# Patient Record
Sex: MALE | Race: WHITE | NOT HISPANIC OR LATINO | Employment: FULL TIME | URBAN - METROPOLITAN AREA
[De-identification: names, ages, dates, MRNs, and addresses within clinical notes are randomized per-mention and may not be internally consistent; named-entity substitution may affect disease eponyms.]

---

## 2017-07-11 ENCOUNTER — APPOINTMENT (OUTPATIENT)
Dept: LAB | Facility: HOSPITAL | Age: 57
End: 2017-07-11

## 2017-07-11 ENCOUNTER — TRANSCRIBE ORDERS (OUTPATIENT)
Dept: ADMINISTRATIVE | Facility: HOSPITAL | Age: 57
End: 2017-07-11

## 2017-07-11 DIAGNOSIS — Z00.8 HEALTH EXAMINATION IN POPULATION SURVEY: ICD-10-CM

## 2017-07-11 DIAGNOSIS — Z00.8 HEALTH EXAMINATION IN POPULATION SURVEY: Primary | ICD-10-CM

## 2017-07-11 LAB
CHOLEST SERPL-MCNC: 255 MG/DL (ref 50–200)
EST. AVERAGE GLUCOSE BLD GHB EST-MCNC: 117 MG/DL
HBA1C MFR BLD: 5.7 % (ref 4.2–6.3)
HDLC SERPL-MCNC: 51 MG/DL (ref 40–60)
LDLC SERPL CALC-MCNC: 169 MG/DL (ref 0–100)
TRIGL SERPL-MCNC: 176 MG/DL

## 2017-07-11 PROCEDURE — 83036 HEMOGLOBIN GLYCOSYLATED A1C: CPT

## 2017-07-11 PROCEDURE — 80061 LIPID PANEL: CPT

## 2017-07-11 PROCEDURE — 36415 COLL VENOUS BLD VENIPUNCTURE: CPT

## 2018-08-08 ENCOUNTER — TRANSCRIBE ORDERS (OUTPATIENT)
Dept: ADMINISTRATIVE | Facility: HOSPITAL | Age: 58
End: 2018-08-08

## 2018-08-08 ENCOUNTER — APPOINTMENT (OUTPATIENT)
Dept: LAB | Facility: HOSPITAL | Age: 58
End: 2018-08-08
Payer: COMMERCIAL

## 2018-08-08 DIAGNOSIS — Z00.8 HEALTH EXAMINATION IN POPULATION SURVEY: ICD-10-CM

## 2018-08-08 DIAGNOSIS — Z00.8 HEALTH EXAMINATION IN POPULATION SURVEY: Primary | ICD-10-CM

## 2018-08-08 LAB
CHOLEST SERPL-MCNC: 262 MG/DL (ref 50–200)
EST. AVERAGE GLUCOSE BLD GHB EST-MCNC: 120 MG/DL
HBA1C MFR BLD: 5.8 % (ref 4.2–6.3)
HDLC SERPL-MCNC: 49 MG/DL (ref 40–60)
LDLC SERPL CALC-MCNC: 173 MG/DL (ref 0–100)
NONHDLC SERPL-MCNC: 213 MG/DL
TRIGL SERPL-MCNC: 201 MG/DL

## 2018-08-08 PROCEDURE — 83036 HEMOGLOBIN GLYCOSYLATED A1C: CPT

## 2018-08-08 PROCEDURE — 80061 LIPID PANEL: CPT

## 2018-08-08 PROCEDURE — 36415 COLL VENOUS BLD VENIPUNCTURE: CPT

## 2018-11-19 RX ORDER — HYDROCODONE/ACETAMINOPHEN 5 MG-500MG
6 TABLET ORAL DAILY
COMMUNITY

## 2018-11-19 RX ORDER — MULTIVITAMIN
1 CAPSULE ORAL DAILY
COMMUNITY

## 2018-11-19 RX ORDER — ASCORBIC ACID 500 MG
500 TABLET ORAL DAILY
COMMUNITY

## 2018-11-19 NOTE — PRE-PROCEDURE INSTRUCTIONS
Pre-Surgery Instructions:   Medication Instructions    ascorbic acid (VITAMIN C) 500 mg tablet Patient was instructed by Physician and understands   esomeprazole (NexIUM) 20 mg capsule Patient was instructed by Physician and understands   lutein 6 mg Patient was instructed by Physician and understands   Multiple Vitamin (MULTIVITAMIN) capsule Patient was instructed by Physician and understands  Follow prep as per physician  You will receive a call with your time to arrive at the hospital   Secure transportation, you will be having anesthesia

## 2018-11-21 ENCOUNTER — ANESTHESIA EVENT (OUTPATIENT)
Dept: PERIOP | Facility: HOSPITAL | Age: 58
End: 2018-11-21
Payer: COMMERCIAL

## 2018-11-21 ENCOUNTER — HOSPITAL ENCOUNTER (OUTPATIENT)
Facility: HOSPITAL | Age: 58
Setting detail: OUTPATIENT SURGERY
Discharge: HOME | End: 2018-11-21
Attending: INTERNAL MEDICINE | Admitting: INTERNAL MEDICINE
Payer: COMMERCIAL

## 2018-11-21 ENCOUNTER — ANESTHESIA (OUTPATIENT)
Dept: PERIOP | Facility: HOSPITAL | Age: 58
End: 2018-11-21
Payer: COMMERCIAL

## 2018-11-21 VITALS
TEMPERATURE: 98.2 F | RESPIRATION RATE: 16 BRPM | HEART RATE: 59 BPM | SYSTOLIC BLOOD PRESSURE: 132 MMHG | HEIGHT: 69 IN | BODY MASS INDEX: 23.52 KG/M2 | DIASTOLIC BLOOD PRESSURE: 72 MMHG | OXYGEN SATURATION: 99 % | WEIGHT: 158.8 LBS

## 2018-11-21 RX ORDER — METOCLOPRAMIDE HYDROCHLORIDE 5 MG/ML
10 INJECTION INTRAMUSCULAR; INTRAVENOUS ONCE AS NEEDED
Status: DISCONTINUED | OUTPATIENT
Start: 2018-11-21 | End: 2018-11-21 | Stop reason: HOSPADM

## 2018-11-21 RX ORDER — HYDRALAZINE HYDROCHLORIDE 20 MG/ML
5 INJECTION INTRAMUSCULAR; INTRAVENOUS AS NEEDED
Status: DISCONTINUED | OUTPATIENT
Start: 2018-11-21 | End: 2018-11-21 | Stop reason: HOSPADM

## 2018-11-21 RX ORDER — MEPERIDINE HYDROCHLORIDE 50 MG/ML
12.5 INJECTION INTRAMUSCULAR; INTRAVENOUS; SUBCUTANEOUS
Status: DISCONTINUED | OUTPATIENT
Start: 2018-11-21 | End: 2018-11-21 | Stop reason: HOSPADM

## 2018-11-21 RX ORDER — LABETALOL HYDROCHLORIDE 5 MG/ML
5 INJECTION, SOLUTION INTRAVENOUS AS NEEDED
Status: DISCONTINUED | OUTPATIENT
Start: 2018-11-21 | End: 2018-11-21 | Stop reason: HOSPADM

## 2018-11-21 RX ORDER — FENTANYL CITRATE/PF 50 MCG/ML
25 SYRINGE (ML) INJECTION
Status: DISCONTINUED | OUTPATIENT
Start: 2018-11-21 | End: 2018-11-21 | Stop reason: HOSPADM

## 2018-11-21 RX ORDER — PROPOFOL 10 MG/ML
INJECTION, EMULSION INTRAVENOUS AS NEEDED
Status: DISCONTINUED | OUTPATIENT
Start: 2018-11-21 | End: 2018-11-21 | Stop reason: SURG

## 2018-11-21 RX ORDER — ONDANSETRON 2 MG/ML
4 INJECTION INTRAMUSCULAR; INTRAVENOUS ONCE AS NEEDED
Status: DISCONTINUED | OUTPATIENT
Start: 2018-11-21 | End: 2018-11-21 | Stop reason: HOSPADM

## 2018-11-21 RX ORDER — SODIUM CHLORIDE 9 MG/ML
20 INJECTION, SOLUTION INTRAVENOUS CONTINUOUS
Status: DISCONTINUED | OUTPATIENT
Start: 2018-11-21 | End: 2018-11-21 | Stop reason: HOSPADM

## 2018-11-21 RX ORDER — PROMETHAZINE HYDROCHLORIDE 25 MG/ML
6.25 INJECTION, SOLUTION INTRAMUSCULAR; INTRAVENOUS ONCE AS NEEDED
Status: DISCONTINUED | OUTPATIENT
Start: 2018-11-21 | End: 2018-11-21 | Stop reason: HOSPADM

## 2018-11-21 RX ADMIN — PROPOFOL 20 MG: 10 INJECTION, EMULSION INTRAVENOUS at 09:26

## 2018-11-21 RX ADMIN — PROPOFOL 30 MG: 10 INJECTION, EMULSION INTRAVENOUS at 09:30

## 2018-11-21 RX ADMIN — PROPOFOL 30 MG: 10 INJECTION, EMULSION INTRAVENOUS at 09:32

## 2018-11-21 RX ADMIN — PROPOFOL 20 MG: 10 INJECTION, EMULSION INTRAVENOUS at 09:29

## 2018-11-21 RX ADMIN — SODIUM CHLORIDE: 0.9 INJECTION, SOLUTION INTRAVENOUS at 09:08

## 2018-11-21 RX ADMIN — PROPOFOL 20 MG: 10 INJECTION, EMULSION INTRAVENOUS at 09:35

## 2018-11-21 RX ADMIN — PROPOFOL 100 MG: 10 INJECTION, EMULSION INTRAVENOUS at 09:25

## 2018-11-21 RX ADMIN — PROPOFOL 20 MG: 10 INJECTION, EMULSION INTRAVENOUS at 09:38

## 2018-11-21 RX ADMIN — PROPOFOL 30 MG: 10 INJECTION, EMULSION INTRAVENOUS at 09:28

## 2018-11-21 RX ADMIN — SODIUM CHLORIDE 20 ML/HR: 0.9 INJECTION, SOLUTION INTRAVENOUS at 08:26

## 2018-11-21 NOTE — OP NOTE
Colonoscopy Procedure Note    Procedure: Colonoscopy    Sedation: Monitored anesthesia care, check anesthesia records    I      ASA Class: I     INDICATIONS:  Average risk screening    POST-OP DIAGNOSIS: See the impression below    Procedure Details     Prior colonoscopy: No prior colonoscopy  Informed consent was obtained for the procedure, including sedation  Risks of perforation, hemorrhage, adverse drug reaction and aspiration were discussed  The patient was placed in the left lateral decubitus position  Based on the pre-procedure assessment, including review of the patient's medical history, medications, allergies, and review of systems, he had been deemed to be an appropriate candidate for conscious sedation; he was therefore sedated with the medications listed below  The patient was monitored continuously with telemetry, pulse oximetry, blood pressure monitoring, and direct observations  A rectal examination was performed  The variable-stiffness pediatric colonoscope was inserted into the rectum and advanced under direct vision to the cecum, which was identified by the ileocecal valve and appendiceal orifice  The quality of the colonic preparation was good  A careful inspection was made as the colonoscope was withdrawn, including a retroflexed view of the rectum; findings and interventions are described below  Findings:  Scattered sigmoid diverticula  Internal and external hemorrhoids nonbleeding  No polyps or masses identified       Estimated Blood Loss: None    Specimens: None        Complications: None; patient tolerated the procedure well  Impression:    Negative average risk screening colonoscopy  Incidental finding of diverticulosis and hemorrhoids    Recommendations:  Repeat colonoscopy in 10 years or sooner if clinically indicated

## 2018-11-21 NOTE — ANESTHESIA POSTPROCEDURE EVALUATION
Post-Op Assessment Note      CV Status:  Stable    Mental Status:  Awake    Hydration Status:  Stable    PONV Controlled:  None    Airway Patency:  Patent and adequate    Post Op Vitals Reviewed: Yes          Staff: Anesthesiologist           BP      Temp      Pulse     Resp      SpO2

## 2018-11-21 NOTE — ANESTHESIA PREPROCEDURE EVALUATION
Review of Systems/Medical History  Patient summary reviewed  Chart reviewed  No history of anesthetic complications     Cardiovascular  Negative cardio ROS Exercise tolerance (METS): >4,     Pulmonary  Negative pulmonary ROS        GI/Hepatic    GERD well controlled,        Negative  ROS        Endo/Other  Negative endo/other ROS      GYN  Negative gynecology ROS          Hematology  Negative hematology ROS      Musculoskeletal  Negative musculoskeletal ROS        Neurology  Negative neurology ROS      Psychology   Negative psychology ROS              Physical Exam    Airway    Mallampati score: II  TM Distance: >3 FB  Neck ROM: full     Dental   No notable dental hx     Cardiovascular  Comment: Negative ROS, Rhythm: regular, Rate: normal, Cardiovascular exam normal    Pulmonary  Pulmonary exam normal Breath sounds clear to auscultation,     Other Findings        Anesthesia Plan  ASA Score- 1     Anesthesia Type- IV sedation with anesthesia with ASA Monitors  Additional Monitors:   Airway Plan:         Plan Factors-    Induction-     Postoperative Plan-     Informed Consent- Anesthetic plan and risks discussed with patient

## 2018-11-21 NOTE — DISCHARGE INSTRUCTIONS
Frantz Mccann      Dear Patient,    If specimens were collected, the office will call you with the pathology results within 2 weeks  It is very important that you follow these instructions:    Because you received intravenous sedation for your exam, you must return home and   · REST TODAY - DO NOT STAY ALONE  · DO NOT operate machinery for 24 hours   · DO NOT drink alcohol for 24 hours  · DO NOT drive for 24 hours  · DO NOT return to work until tomorrow   · DO NOT sign important paperwork for 24 hours    ·  If the site where your IV was placed is painful, place warm wet  compresses on the site until the soreness is relieved  Call us if there  is no improvement within 24 hours  · Unless otherwise instructed, you may resume your regular diet,  Start by taking  crackers and clear liquids  If no nausea or vomiting, you may advance your diet as tolerated  · Call your physician at [phone] if you develop any of the following symptoms:  · NEW OR INCREASED BLEEDING  · NEW ABDOMINAL DISTENTION (SWELLING)  · INCREASING NAUSEA, VOMITING OR PAIN  · FEVER/CHILLS  · BLACK/BLOODY STOOLS    If you are unable to contact the the doctor or your primary physician and you are having a severe complication, go to the nearest emergency room or call 911  Call [facility] at [phone] if you have any problems or questions related to your procedure  NOTE: After normal office hours (Monday-Friday 9:00 a m  to 4:00 p m ), the answering service will answer the phone, take a message, and they will contact the on call physician to call you

## 2019-02-14 PROCEDURE — 88305 TISSUE EXAM BY PATHOLOGIST: CPT | Performed by: PATHOLOGY

## 2019-02-15 ENCOUNTER — LAB REQUISITION (OUTPATIENT)
Dept: LAB | Facility: HOSPITAL | Age: 59
End: 2019-02-15
Payer: COMMERCIAL

## 2019-02-15 DIAGNOSIS — C44.629 SQUAMOUS CELL CARCINOMA OF SKIN OF LEFT UPPER LIMB, INCLUDING SHOULDER: ICD-10-CM

## 2019-03-12 PROCEDURE — 88305 TISSUE EXAM BY PATHOLOGIST: CPT | Performed by: PATHOLOGY

## 2019-03-13 ENCOUNTER — LAB REQUISITION (OUTPATIENT)
Dept: LAB | Facility: HOSPITAL | Age: 59
End: 2019-03-13
Payer: COMMERCIAL

## 2019-03-13 DIAGNOSIS — C43.62 MALIGNANT MELANOMA OF LEFT UPPER EXTREMITY INCLUDING SHOULDER (HCC): ICD-10-CM

## 2019-04-23 PROCEDURE — 88305 TISSUE EXAM BY PATHOLOGIST: CPT | Performed by: PATHOLOGY

## 2019-04-24 ENCOUNTER — LAB REQUISITION (OUTPATIENT)
Dept: LAB | Facility: HOSPITAL | Age: 59
End: 2019-04-24
Payer: COMMERCIAL

## 2019-04-24 DIAGNOSIS — D03.62 MELANOMA IN SITU OF LEFT UPPER EXTREMITY INCLUDING SHOULDER (HCC): ICD-10-CM

## 2020-06-30 ENCOUNTER — OFFICE VISIT (OUTPATIENT)
Dept: PODIATRY | Facility: CLINIC | Age: 60
End: 2020-06-30
Payer: COMMERCIAL

## 2020-06-30 VITALS — BODY MASS INDEX: 24.25 KG/M2 | HEIGHT: 68 IN | WEIGHT: 160 LBS

## 2020-06-30 DIAGNOSIS — M72.2 PLANTAR FASCIITIS, LEFT: Primary | ICD-10-CM

## 2020-06-30 DIAGNOSIS — M72.2 PLANTAR FASCIITIS: ICD-10-CM

## 2020-06-30 PROCEDURE — 99203 OFFICE O/P NEW LOW 30 MIN: CPT | Performed by: PODIATRIST

## 2020-06-30 PROCEDURE — 20550 NJX 1 TENDON SHEATH/LIGAMENT: CPT | Performed by: PODIATRIST

## 2020-06-30 RX ORDER — LIDOCAINE HYDROCHLORIDE 10 MG/ML
1 INJECTION, SOLUTION INFILTRATION; PERINEURAL ONCE
Status: COMPLETED | OUTPATIENT
Start: 2020-06-30 | End: 2020-06-30

## 2020-06-30 RX ORDER — TRIAMCINOLONE ACETONIDE 40 MG/ML
40 INJECTION, SUSPENSION INTRA-ARTICULAR; INTRAMUSCULAR ONCE
Status: COMPLETED | OUTPATIENT
Start: 2020-06-30 | End: 2020-06-30

## 2020-06-30 RX ADMIN — TRIAMCINOLONE ACETONIDE 40 MG: 40 INJECTION, SUSPENSION INTRA-ARTICULAR; INTRAMUSCULAR at 16:16

## 2020-06-30 RX ADMIN — LIDOCAINE HYDROCHLORIDE 1 ML: 10 INJECTION, SOLUTION INFILTRATION; PERINEURAL at 16:16

## 2020-07-20 ENCOUNTER — APPOINTMENT (OUTPATIENT)
Dept: RADIOLOGY | Facility: CLINIC | Age: 60
End: 2020-07-20
Payer: COMMERCIAL

## 2020-07-20 DIAGNOSIS — M72.2 PLANTAR FASCIITIS, LEFT: ICD-10-CM

## 2020-07-20 PROCEDURE — 73650 X-RAY EXAM OF HEEL: CPT

## 2020-08-04 ENCOUNTER — OFFICE VISIT (OUTPATIENT)
Dept: PODIATRY | Facility: CLINIC | Age: 60
End: 2020-08-04
Payer: COMMERCIAL

## 2020-08-04 VITALS
HEIGHT: 69 IN | TEMPERATURE: 97.1 F | WEIGHT: 165 LBS | DIASTOLIC BLOOD PRESSURE: 79 MMHG | HEART RATE: 61 BPM | BODY MASS INDEX: 24.44 KG/M2 | SYSTOLIC BLOOD PRESSURE: 136 MMHG

## 2020-08-04 DIAGNOSIS — M72.2 PLANTAR FASCIITIS, LEFT: Primary | ICD-10-CM

## 2020-08-04 PROCEDURE — 99213 OFFICE O/P EST LOW 20 MIN: CPT | Performed by: PODIATRIST

## 2020-08-04 NOTE — PATIENT INSTRUCTIONS
Plantar Fasciitis Exercises   WHAT YOU NEED TO KNOW:   Plantar fasciitis exercises help stretch your plantar fascia, calf muscles, and Achilles tendon  They also help strengthen the muscles that support your heel and foot  Exercises and stretching can help prevent plantar fasciitis from getting worse or coming back  DISCHARGE INSTRUCTIONS:   Contact your healthcare provider if:   · Your pain and swelling increase  · You develop new knee, hip, or back pain  · You have questions or concerns about your condition or care  How to do plantar fasciitis exercises:  Ask your healthcare provider when to start these exercises and how often to do them  · Heel stretch:  Stand up straight with your hands on a wall  Place your injured leg slightly behind your other leg  Keep your heels flat on the floor, lean forward, and bend both knees  Hold for 30 seconds  · Calf stretch:  Stand up straight with your hands on a wall  Step forward so that your uninjured foot is in front of your injured foot  Keep your front leg bent and your back leg straight  Gently lean forward until you feel your calf stretch  Hold for 30 seconds and then relax  · Seated plantar fascia stretch:  Sit on a firm surface, such as the floor or a mat  Extend your legs out in front of you  Raise your injured foot a few inches off the ground  Keep your leg straight  Grab the toes of your injured foot and pull them toward you  With your other hand, feel your plantar fascia  You should feel it press outward  Hold for 30 seconds  If you cannot reach your toes, loop a towel or tie around your foot  Gently pull on the towel or tie and flex your toes toward you  · Heel raises:  Stand on the injured leg  Raise your other leg off the ground  Hold onto a railing or wall for balance  Slowly rise up on the toes of your injured leg  Hold for 5 seconds  Slowly lower your heel to the ground             · Toe curls:  Place a towel on the floor  Put your foot flat on the towel  Grab the towel with your toes by curling them around the towel  Lift the towel up with your toes  Follow up with your healthcare provider as directed:  Write down your questions so you remember to ask them during your visits  © 2017 [employer] Information is for End User's use only and may not be sold, redistributed or otherwise used for commercial purposes  All illustrations and images included in CareNotes® are the copyrighted property of [employer] , [employer]  or [employer]  The above information is an  only  It is not intended as medical advice for individual conditions or treatments  Talk to your doctor, nurse or pharmacist before following any medical regimen to see if it is safe and effective for you

## 2020-08-04 NOTE — PROGRESS NOTES
Assessment/Plan:      Diagnoses and all orders for this visit:    Plantar fasciitis, left      Resolved  Patient is 85-90% pain free  Continue home therapy, ice, and compliance with PT and shoe gear  This should continue to resolve    Reviewed XRay, there is a spur but this is not necessarily clinically important  Educated on plantar fascitis being a soft tissue issue, no bony issue  Discussed more aggressive care such as formal PT, second cortisone injection, surgical intervention but with his rapid improvement this should not be necessary  HE may call as needed  Subjective:     Patient ID: Samina Sadler is a 61 y o  male  Patient had left heel injection 4 weeks ago  He states he has been doing home therapy, ice and wearing better shoes  He stopped playing tennis as well  He got an xray just to "check for spurs " He states he is about 85%-90% better  Review of Systems   Constitutional: Negative  HENT: Negative for sore throat  Musculoskeletal: Negative for arthralgias and gait problem  Skin: Negative for color change, rash and wound  Neurological: Negative for weakness and numbness  Objective:     Physical Exam    Vitals reviewed    Constitutional: Patient is not distressed  Patient is well developed  Vascular: Dorsalis pedis and posterior tibial pulses palpable  Capillary refill time within normal limits to all digits  No erythema  No edema  No significant varicosities  Dermatology: No rash  No open lesions  Present pedal hair  Skin has healthy turgor  Musculoskeletal: Normal range of motion to ankle, subtalar joint, and midtarsal joint  Normal range of motion first MTPJ  Manual muscle testing 5 out of 5 for inversion/eversion/dorsiflexion/plantarflexion  On stance patients feet are generally rectus  No left heel pain at insertion of plantar fascia    Neurological exam: Monofilament sensation is intact  Vibratory sensation is intact   Achilles reflex is normal  Patient is AAOx3  Saphenous nerve sensation: normal  Tibial nerve sensation: normal  Superficial peroneal nerve sensation: normal  Deep peroneal nerve sensation: normal  Sural nerve sensation: normal        XRay left foot 2 views small heel spur  No joint abnormality or bony deformity

## 2020-11-11 ENCOUNTER — TELEPHONE (OUTPATIENT)
Dept: PODIATRY | Facility: CLINIC | Age: 60
End: 2020-11-11

## 2020-11-24 ENCOUNTER — OFFICE VISIT (OUTPATIENT)
Dept: PODIATRY | Facility: CLINIC | Age: 60
End: 2020-11-24
Payer: COMMERCIAL

## 2020-11-24 VITALS — WEIGHT: 165 LBS | BODY MASS INDEX: 24.44 KG/M2 | HEIGHT: 69 IN | TEMPERATURE: 97.6 F

## 2020-11-24 DIAGNOSIS — M72.2 PLANTAR FASCIITIS, LEFT: Primary | ICD-10-CM

## 2020-11-24 PROCEDURE — 20550 NJX 1 TENDON SHEATH/LIGAMENT: CPT | Performed by: PODIATRIST

## 2020-11-24 RX ORDER — TRIAMCINOLONE ACETONIDE 40 MG/ML
40 INJECTION, SUSPENSION INTRA-ARTICULAR; INTRAMUSCULAR ONCE
Status: COMPLETED | OUTPATIENT
Start: 2020-11-24 | End: 2020-11-24

## 2020-11-24 RX ORDER — LIDOCAINE HYDROCHLORIDE 10 MG/ML
1 INJECTION, SOLUTION INFILTRATION; PERINEURAL ONCE
Status: COMPLETED | OUTPATIENT
Start: 2020-11-24 | End: 2020-11-24

## 2020-11-24 RX ORDER — CYCLOSPORINE 0.5 MG/ML
1 EMULSION OPHTHALMIC 2 TIMES DAILY
COMMUNITY
Start: 2020-10-17

## 2020-11-24 RX ADMIN — TRIAMCINOLONE ACETONIDE 40 MG: 40 INJECTION, SUSPENSION INTRA-ARTICULAR; INTRAMUSCULAR at 16:31

## 2020-11-24 RX ADMIN — LIDOCAINE HYDROCHLORIDE 1 ML: 10 INJECTION, SOLUTION INFILTRATION; PERINEURAL at 16:30

## 2020-12-28 ENCOUNTER — IMMUNIZATIONS (OUTPATIENT)
Dept: FAMILY MEDICINE CLINIC | Facility: HOSPITAL | Age: 60
End: 2020-12-28
Payer: COMMERCIAL

## 2020-12-28 DIAGNOSIS — Z23 ENCOUNTER FOR IMMUNIZATION: ICD-10-CM

## 2020-12-28 PROCEDURE — 91301 SARS-COV-2 / COVID-19 MRNA VACCINE (MODERNA) 100 MCG: CPT

## 2020-12-28 PROCEDURE — 0011A SARS-COV-2 / COVID-19 MRNA VACCINE (MODERNA) 100 MCG: CPT

## 2021-01-26 ENCOUNTER — IMMUNIZATIONS (OUTPATIENT)
Dept: FAMILY MEDICINE CLINIC | Facility: HOSPITAL | Age: 61
End: 2021-01-26

## 2021-01-26 DIAGNOSIS — Z23 ENCOUNTER FOR IMMUNIZATION: Primary | ICD-10-CM

## 2021-01-26 PROCEDURE — 0012A SARS-COV-2 / COVID-19 MRNA VACCINE (MODERNA) 100 MCG: CPT

## 2021-01-26 PROCEDURE — 91301 SARS-COV-2 / COVID-19 MRNA VACCINE (MODERNA) 100 MCG: CPT

## 2021-04-28 ENCOUNTER — LAB (OUTPATIENT)
Dept: LAB | Facility: HOSPITAL | Age: 61
End: 2021-04-28
Payer: COMMERCIAL

## 2021-04-28 ENCOUNTER — TRANSCRIBE ORDERS (OUTPATIENT)
Dept: ADMINISTRATIVE | Facility: HOSPITAL | Age: 61
End: 2021-04-28

## 2021-04-28 DIAGNOSIS — Z00.8 HEALTH EXAMINATION IN POPULATION SURVEY: Primary | ICD-10-CM

## 2021-04-28 DIAGNOSIS — Z00.8 HEALTH EXAMINATION IN POPULATION SURVEY: ICD-10-CM

## 2021-04-28 LAB
CHOLEST SERPL-MCNC: 268 MG/DL (ref 50–200)
EST. AVERAGE GLUCOSE BLD GHB EST-MCNC: 120 MG/DL
HBA1C MFR BLD: 5.8 %
HDLC SERPL-MCNC: 48 MG/DL
LDLC SERPL CALC-MCNC: 173 MG/DL (ref 0–100)
NONHDLC SERPL-MCNC: 220 MG/DL
TRIGL SERPL-MCNC: 233 MG/DL

## 2021-04-28 PROCEDURE — 83036 HEMOGLOBIN GLYCOSYLATED A1C: CPT

## 2021-04-28 PROCEDURE — 80061 LIPID PANEL: CPT

## 2021-04-28 PROCEDURE — 36415 COLL VENOUS BLD VENIPUNCTURE: CPT

## 2021-05-24 ENCOUNTER — OFFICE VISIT (OUTPATIENT)
Dept: FAMILY MEDICINE CLINIC | Facility: CLINIC | Age: 61
End: 2021-05-24
Payer: COMMERCIAL

## 2021-05-24 VITALS
OXYGEN SATURATION: 98 % | HEART RATE: 64 BPM | BODY MASS INDEX: 25.4 KG/M2 | HEIGHT: 68 IN | WEIGHT: 167.6 LBS | RESPIRATION RATE: 16 BRPM | DIASTOLIC BLOOD PRESSURE: 86 MMHG | TEMPERATURE: 97.8 F | SYSTOLIC BLOOD PRESSURE: 162 MMHG

## 2021-05-24 DIAGNOSIS — Z13.6 SCREENING FOR CARDIOVASCULAR CONDITION: ICD-10-CM

## 2021-05-24 DIAGNOSIS — Z11.59 ENCOUNTER FOR HEPATITIS C SCREENING TEST FOR LOW RISK PATIENT: ICD-10-CM

## 2021-05-24 DIAGNOSIS — Z12.5 SCREENING FOR PROSTATE CANCER: ICD-10-CM

## 2021-05-24 DIAGNOSIS — Z00.00 ENCOUNTER FOR WELL ADULT EXAM WITHOUT ABNORMAL FINDINGS: Primary | ICD-10-CM

## 2021-05-24 PROCEDURE — 99386 PREV VISIT NEW AGE 40-64: CPT | Performed by: FAMILY MEDICINE

## 2021-05-24 NOTE — PROGRESS NOTES
Assessment/Plan:       Diagnoses and all orders for this visit:    Encounter for well adult exam without abnormal findings    Screening for prostate cancer  -     PSA, Total Screen; Future  -     UA (URINE) with reflex to Scope    Encounter for hepatitis C screening test for low risk patient  -     Hepatitis C antibody; Future    Screening for cardiovascular condition  -     CBC and differential; Future  -     Comprehensive metabolic panel; Future       72-year-old male new patient   Labs ordered   Discussed his blood pressure will continue to monitor I recommended taking the blood pressure at home and record  Otherwise we discussed his high cholesterol is cholesterol is been very high would highly recommend he start a cholesterol medicine for this   Patient declined at the time of the visit and states he will try to work on his diet  Otherwise he is up-to-date on rest his health maintenance  He can follow up in 4-6 months      Subjective:     Chief Complaint   Patient presents with    Physical Exam     No new or ongoing issues to address today per patient   Establish Care        Patient ID: Clarita Arciniega is a 64 y o  male  HPI    The following portions of the patient's history were reviewed and updated as appropriate: allergies, current medications, past family history, past medical history, past social history, past surgical history and problem list     Review of Systems   Constitutional: Negative  HENT: Negative  Eyes: Negative  Respiratory: Negative  Cardiovascular: Negative  Gastrointestinal: Negative  Endocrine: Negative  Genitourinary: Negative  Musculoskeletal: Negative  Skin: Negative  Allergic/Immunologic: Negative  Neurological: Negative  Hematological: Negative  Psychiatric/Behavioral: Negative  All other systems reviewed and are negative          Objective:    Vitals:    05/24/21 1713   BP: 162/86   BP Location: Left arm   Patient Position: Sitting Cuff Size: Standard   Pulse: 64   Resp: 16   Temp: 97 8 °F (36 6 °C)   TempSrc: Tympanic   SpO2: 98%   Weight: 76 kg (167 lb 9 6 oz)   Height: 5' 7 72" (1 72 m)          Physical Exam  Vitals signs and nursing note reviewed  Constitutional:       General: He is not in acute distress  Appearance: He is well-developed  HENT:      Head: Normocephalic  Right Ear: External ear normal       Left Ear: External ear normal       Nose: Nose normal    Eyes:      General:         Right eye: No discharge  Left eye: No discharge  Conjunctiva/sclera: Conjunctivae normal       Pupils: Pupils are equal, round, and reactive to light  Neck:      Musculoskeletal: Normal range of motion  Cardiovascular:      Rate and Rhythm: Normal rate and regular rhythm  Heart sounds: Normal heart sounds  Pulmonary:      Effort: Pulmonary effort is normal       Breath sounds: Normal breath sounds  Abdominal:      General: Bowel sounds are normal  There is no distension  Palpations: Abdomen is soft  Tenderness: There is no abdominal tenderness  Musculoskeletal: Normal range of motion  Skin:     General: Skin is warm and dry  Findings: No rash  Neurological:      Mental Status: He is alert and oriented to person, place, and time  Cranial Nerves: No cranial nerve deficit  Psychiatric:         Behavior: Behavior normal          Thought Content: Thought content normal          Judgment: Judgment normal          BMI Counseling: Body mass index is 25 7 kg/m²  The BMI is above normal  Nutrition recommendations include reducing portion sizes, decreasing overall calorie intake, 3-5 servings of fruits/vegetables daily, reducing fast food intake, consuming healthier snacks, decreasing soda and/or juice intake, moderation in carbohydrate intake, increasing intake of lean protein, reducing intake of saturated fat and trans fat and reducing intake of cholesterol   Exercise recommendations include exercising 3-5 times per week

## 2021-05-24 NOTE — PATIENT INSTRUCTIONS
Obesity   AMBULATORY CARE:   Obesity  is when your body mass index (BMI) is greater than 30  Your healthcare provider will use your height and weight to measure your BMI  The risks of obesity include  many health problems, such as injuries or physical disability  You may need tests to check for the following:  · Diabetes    · High blood pressure or high cholesterol    · Heart disease    · Gallbladder or liver disease    · Cancer of the colon, breast, prostate, liver, or kidney    · Sleep apnea    · Arthritis or gout    Seek care immediately if:   · You have a severe headache, confusion, or difficulty speaking  · You have weakness on one side of your body  · You have chest pain, sweating, or shortness of breath  Contact your healthcare provider if:   · You have symptoms of gallbladder or liver disease, such as pain in your upper abdomen  · You have knee or hip pain and discomfort while walking  · You have symptoms of diabetes, such as intense hunger and thirst, and frequent urination  · You have symptoms of sleep apnea, such as snoring or daytime sleepiness  · You have questions or concerns about your condition or care  Treatment for obesity  focuses on helping you lose weight to improve your health  Even a small decrease in BMI can reduce the risk for many health problems  Your healthcare provider will help you set a weight-loss goal   · Lifestyle changes  are the first step in treating obesity  These include making healthy food choices and getting regular physical activity  Your healthcare provider may suggest a weight-loss program that involves coaching, education, and therapy  · Medicine  may help you lose weight when it is used with a healthy diet and physical activity  · Surgery  can help you lose weight if you are very obese and have other health problems  There are several types of weight-loss surgery  Ask your healthcare provider for more information      Be successful losing weight:   · Set small, realistic goals  An example of a small goal is to walk for 20 minutes 5 days a week  Anther goal is to lose 5% of your body weight  · Tell friends, family members, and coworkers about your goals  and ask for their support  Ask a friend to lose weight with you, or join a weight-loss support group  · Identify foods or triggers that may cause you to overeat , and find ways to avoid them  Remove tempting high-calorie foods from your home and workplace  Place a bowl of fresh fruit on your kitchen counter  If stress causes you to eat, then find other ways to cope with stress  · Keep a diary to track what you eat and drink  Also write down how many minutes of physical activity you do each day  Weigh yourself once a week and record it in your diary  Eating changes: You will need to eat 500 to 1,000 fewer calories each day than you currently eat to lose 1 to 2 pounds a week  The following changes will help you cut calories:  · Eat smaller portions  Use small plates, no larger than 9 inches in diameter  Fill your plate half full of fruits and vegetables  Measure your food using measuring cups until you know what a serving size looks like  · Eat 3 meals and 1 or 2 snacks each day  Plan your meals in advance  Chel Laughter and eat at home most of the time  Eat slowly  Do not skip meals  Skipping meals can lead to overeating later in the day  This can make it harder for you to lose weight  Talk with a dietitian to help you make a meal plan and schedule that is right for you  · Eat fruits and vegetables at every meal   They are low in calories and high in fiber, which makes you feel full  Do not add butter, margarine, or cream sauce to vegetables  Use herbs to season steamed vegetables  · Eat less fat and fewer fried foods  Eat more baked or grilled chicken and fish  These protein sources are lower in calories and fat than red meat  Limit fast food   Dress your salads with olive oil and vinegar instead of bottled dressing  · Limit the amount of sugar you eat  Do not drink sugary beverages  Limit alcohol  Activity changes:  Physical activity is good for your body in many ways  It helps you burn calories and build strong muscles  It decreases stress and depression, and improves your mood  It can also help you sleep better  Talk to your healthcare provider before you begin an exercise program   · Exercise for at least 30 minutes 5 days a week  Start slowly  Set aside time each day for physical activity that you enjoy and that is convenient for you  It is best to do both weight training and an activity that increases your heart rate, such as walking, bicycling, or swimming  · Find ways to be more active  Do yard work and housecleaning  Walk up the stairs instead of using elevators  Spend your leisure time going to events that require walking, such as outdoor festivals or fairs  This extra physical activity can help you lose weight and keep it off  Follow up with your healthcare provider as directed: You may need to meet with a dietitian  Write down your questions so you remember to ask them during your visits  © Copyright [address] [place] [address] Drive Information is for End User's use only and may not be sold, redistributed or otherwise used for commercial purposes  All illustrations and images included in CareNotes® are the copyrighted property of A D A M [employer]  or [facility] Malka Poole   The above information is an  only  It is not intended as medical advice for individual conditions or treatments  Talk to your doctor, nurse or pharmacist before following any medical regimen to see if it is safe and effective for you  Weight Management   AMBULATORY CARE:   Why it is important to manage your weight:  Being overweight increases your risk of health conditions such as heart disease, high blood pressure, type 2 diabetes, and certain types of cancer   It can also increase your risk for osteoarthritis, sleep apnea, and other respiratory problems  Aim for a slow, steady weight loss  Even a small amount of weight loss can lower your risk of health problems  How to lose weight safely:  A safe and healthy way to lose weight is to eat fewer calories and get regular exercise  · You can lose up about 1 pound a week by decreasing the number of calories you eat by 500 calories each day  You can decrease calories by eating smaller portion sizes or by cutting out high-calorie foods  Read labels to find out how many calories are in the foods you eat  · You can also burn calories with exercise such as walking, swimming, or biking  You will be more likely to keep weight off if you make these changes part of your lifestyle  Exercise at least 30 minutes per day on most days of the week  You can also fit in more physical activity by taking the stairs instead of the elevator or parking farther away from stores  Ask your healthcare provider about the best exercise plan for you  Healthy meal plan for weight management:  A healthy meal plan includes a variety of foods, contains fewer calories, and helps you stay healthy  A healthy meal plan includes the following:     · Eat whole-grain foods more often  A healthy meal plan should contain fiber  Fiber is the part of grains, fruits, and vegetables that is not broken down by your body  Whole-grain foods are healthy and provide extra fiber in your diet  Some examples of whole-grain foods are whole-wheat breads and pastas, oatmeal, brown rice, and bulgur  · Eat a variety of vegetables every day  Include dark, leafy greens such as spinach, kale, mook greens, and mustard greens  Eat yellow and orange vegetables such as carrots, sweet potatoes, and winter squash  · Eat a variety of fruits every day  Choose fresh or canned fruit (canned in its own juice or light syrup) instead of juice  Fruit juice has very little or no fiber      · Eat low-fat dairy foods  Drink fat-free (skim) milk or 1% milk  Eat fat-free yogurt and low-fat cottage cheese  Try low-fat cheeses such as mozzarella and other reduced-fat cheeses  · Choose meat and other protein foods that are low in fat  Choose beans or other legumes such as split peas or lentils  Choose fish, skinless poultry (chicken or turkey), or lean cuts of red meat (beef or pork)  Before you cook meat or poultry, cut off any visible fat  · Use less fat and oil  Try baking foods instead of frying them  Add less fat, such as margarine, sour cream, regular salad dressing and mayonnaise to foods  Eat fewer high-fat foods  Some examples of high-fat foods include french fries, doughnuts, ice cream, and cakes  · Eat fewer sweets  Limit foods and drinks that are high in sugar  This includes candy, cookies, regular soda, and sweetened drinks  Ways to decrease calories:   · Eat smaller portions  ? Use a small plate with smaller servings  ? Do not eat second helpings  ? When you eat at a restaurant, ask for a box and place half of your meal in the box before you eat  ? Share an entrée with someone else  · Replace high-calorie snacks with healthy, low-calorie snacks  ? Choose fresh fruit, vegetables, fat-free rice cakes, or air-popped popcorn instead of potato chips, nuts, or chocolate  ? Choose water or calorie-free drinks instead of soda or sweetened drinks  · Do not shop for groceries when you are hungry  You may be more likely to make unhealthy food choices  Take a grocery list of healthy foods and shop after you have eaten  · Eat regular meals  Do not skip meals  Skipping meals can lead to overeating later in the day  This can make it harder for you to lose weight  Eat a healthy snack in place of a meal if you do not have time to eat a regular meal  Talk with a dietitian to help you create a meal plan and schedule that is right for you      Other things to consider as you try to lose weight:   · Be aware of situations that may give you the urge to overeat, such as eating while watching television  Find ways to avoid these situations  For example, read a book, go for a walk, or do crafts  · Meet with a weight loss support group or friends who are also trying to lose weight  This may help you stay motivated to continue working on your weight loss goals  © Copyright [address] Information is for End User's use only and may not be sold, redistributed or otherwise used for commercial purposes  All illustrations and images included in CareNotes® are the copyrighted property of [employer]  or [facility] [place] Malka Poole   The above information is an  only  It is not intended as medical advice for individual conditions or treatments  Talk to your doctor, nurse or pharmacist before following any medical regimen to see if it is safe and effective for you

## 2021-06-08 ENCOUNTER — LAB (OUTPATIENT)
Dept: LAB | Facility: HOSPITAL | Age: 61
End: 2021-06-08
Payer: COMMERCIAL

## 2021-06-08 DIAGNOSIS — Z13.6 SCREENING FOR CARDIOVASCULAR CONDITION: ICD-10-CM

## 2021-06-08 DIAGNOSIS — Z12.5 SCREENING FOR PROSTATE CANCER: ICD-10-CM

## 2021-06-08 DIAGNOSIS — Z11.59 ENCOUNTER FOR HEPATITIS C SCREENING TEST FOR LOW RISK PATIENT: ICD-10-CM

## 2021-06-08 LAB
ALBUMIN SERPL BCP-MCNC: 4 G/DL (ref 3.5–5)
ALP SERPL-CCNC: 67 U/L (ref 46–116)
ALT SERPL W P-5'-P-CCNC: 33 U/L (ref 12–78)
ANION GAP SERPL CALCULATED.3IONS-SCNC: 3 MMOL/L (ref 4–13)
AST SERPL W P-5'-P-CCNC: 20 U/L (ref 5–45)
BASOPHILS # BLD AUTO: 0.06 THOUSANDS/ΜL (ref 0–0.1)
BASOPHILS NFR BLD AUTO: 1 % (ref 0–1)
BILIRUB SERPL-MCNC: 0.45 MG/DL (ref 0.2–1)
BUN SERPL-MCNC: 26 MG/DL (ref 5–25)
CALCIUM SERPL-MCNC: 9.1 MG/DL (ref 8.3–10.1)
CHLORIDE SERPL-SCNC: 106 MMOL/L (ref 100–108)
CO2 SERPL-SCNC: 30 MMOL/L (ref 21–32)
CREAT SERPL-MCNC: 1.23 MG/DL (ref 0.6–1.3)
EOSINOPHIL # BLD AUTO: 0.07 THOUSAND/ΜL (ref 0–0.61)
EOSINOPHIL NFR BLD AUTO: 1 % (ref 0–6)
ERYTHROCYTE [DISTWIDTH] IN BLOOD BY AUTOMATED COUNT: 12 % (ref 11.6–15.1)
GFR SERPL CREATININE-BSD FRML MDRD: 63 ML/MIN/1.73SQ M
GLUCOSE P FAST SERPL-MCNC: 92 MG/DL (ref 65–99)
HCT VFR BLD AUTO: 45.4 % (ref 36.5–49.3)
HCV AB SER QL: NORMAL
HGB BLD-MCNC: 14.2 G/DL (ref 12–17)
IMM GRANULOCYTES # BLD AUTO: 0.03 THOUSAND/UL (ref 0–0.2)
IMM GRANULOCYTES NFR BLD AUTO: 1 % (ref 0–2)
LYMPHOCYTES # BLD AUTO: 2.28 THOUSANDS/ΜL (ref 0.6–4.47)
LYMPHOCYTES NFR BLD AUTO: 41 % (ref 14–44)
MCH RBC QN AUTO: 30.4 PG (ref 26.8–34.3)
MCHC RBC AUTO-ENTMCNC: 31.3 G/DL (ref 31.4–37.4)
MCV RBC AUTO: 97 FL (ref 82–98)
MONOCYTES # BLD AUTO: 0.51 THOUSAND/ΜL (ref 0.17–1.22)
MONOCYTES NFR BLD AUTO: 9 % (ref 4–12)
NEUTROPHILS # BLD AUTO: 2.67 THOUSANDS/ΜL (ref 1.85–7.62)
NEUTS SEG NFR BLD AUTO: 47 % (ref 43–75)
NRBC BLD AUTO-RTO: 0 /100 WBCS
PLATELET # BLD AUTO: 236 THOUSANDS/UL (ref 149–390)
PMV BLD AUTO: 10.9 FL (ref 8.9–12.7)
POTASSIUM SERPL-SCNC: 4.4 MMOL/L (ref 3.5–5.3)
PROT SERPL-MCNC: 7.4 G/DL (ref 6.4–8.2)
PSA SERPL-MCNC: 0.9 NG/ML (ref 0–4)
RBC # BLD AUTO: 4.67 MILLION/UL (ref 3.88–5.62)
SODIUM SERPL-SCNC: 139 MMOL/L (ref 136–145)
WBC # BLD AUTO: 5.62 THOUSAND/UL (ref 4.31–10.16)

## 2021-06-08 PROCEDURE — 85025 COMPLETE CBC W/AUTO DIFF WBC: CPT

## 2021-06-08 PROCEDURE — G0103 PSA SCREENING: HCPCS

## 2021-06-08 PROCEDURE — 80053 COMPREHEN METABOLIC PANEL: CPT

## 2021-06-08 PROCEDURE — 86803 HEPATITIS C AB TEST: CPT

## 2021-06-08 PROCEDURE — 36415 COLL VENOUS BLD VENIPUNCTURE: CPT

## 2021-08-31 DIAGNOSIS — U07.1 COVID-19 VIRUS INFECTION: Primary | ICD-10-CM

## 2021-09-01 PROCEDURE — U0003 INFECTIOUS AGENT DETECTION BY NUCLEIC ACID (DNA OR RNA); SEVERE ACUTE RESPIRATORY SYNDROME CORONAVIRUS 2 (SARS-COV-2) (CORONAVIRUS DISEASE [COVID-19]), AMPLIFIED PROBE TECHNIQUE, MAKING USE OF HIGH THROUGHPUT TECHNOLOGIES AS DESCRIBED BY CMS-2020-01-R: HCPCS | Performed by: FAMILY MEDICINE

## 2021-09-01 PROCEDURE — U0005 INFEC AGEN DETEC AMPLI PROBE: HCPCS | Performed by: FAMILY MEDICINE

## 2021-10-23 ENCOUNTER — IMMUNIZATIONS (OUTPATIENT)
Dept: FAMILY MEDICINE CLINIC | Facility: HOSPITAL | Age: 61
End: 2021-10-23

## 2021-10-23 DIAGNOSIS — Z23 ENCOUNTER FOR IMMUNIZATION: Primary | ICD-10-CM

## 2021-11-02 ENCOUNTER — TELEPHONE (OUTPATIENT)
Dept: FAMILY MEDICINE CLINIC | Facility: CLINIC | Age: 61
End: 2021-11-02

## 2021-11-05 DIAGNOSIS — E78.5 HYPERLIPIDEMIA, UNSPECIFIED HYPERLIPIDEMIA TYPE: Primary | ICD-10-CM

## 2021-11-12 ENCOUNTER — LAB (OUTPATIENT)
Dept: LAB | Facility: HOSPITAL | Age: 61
End: 2021-11-12
Payer: COMMERCIAL

## 2021-11-12 DIAGNOSIS — E78.5 HYPERLIPIDEMIA, UNSPECIFIED HYPERLIPIDEMIA TYPE: ICD-10-CM

## 2021-11-12 LAB
CHOLEST SERPL-MCNC: 255 MG/DL (ref 50–200)
HDLC SERPL-MCNC: 51 MG/DL
LDLC SERPL CALC-MCNC: 167 MG/DL (ref 0–100)
NONHDLC SERPL-MCNC: 204 MG/DL
TRIGL SERPL-MCNC: 184 MG/DL

## 2021-11-12 PROCEDURE — 36415 COLL VENOUS BLD VENIPUNCTURE: CPT

## 2021-11-12 PROCEDURE — 80061 LIPID PANEL: CPT

## 2021-11-22 ENCOUNTER — OFFICE VISIT (OUTPATIENT)
Dept: FAMILY MEDICINE CLINIC | Facility: CLINIC | Age: 61
End: 2021-11-22
Payer: COMMERCIAL

## 2021-11-22 VITALS
BODY MASS INDEX: 25.98 KG/M2 | HEART RATE: 86 BPM | DIASTOLIC BLOOD PRESSURE: 82 MMHG | WEIGHT: 171.4 LBS | TEMPERATURE: 97.7 F | OXYGEN SATURATION: 98 % | HEIGHT: 68 IN | SYSTOLIC BLOOD PRESSURE: 126 MMHG | RESPIRATION RATE: 18 BRPM

## 2021-11-22 DIAGNOSIS — Z00.00 ENCOUNTER FOR WELL ADULT EXAM WITHOUT ABNORMAL FINDINGS: Primary | ICD-10-CM

## 2021-11-22 DIAGNOSIS — E78.2 MIXED HYPERLIPIDEMIA: ICD-10-CM

## 2021-11-22 DIAGNOSIS — N52.9 ERECTILE DYSFUNCTION, UNSPECIFIED ERECTILE DYSFUNCTION TYPE: ICD-10-CM

## 2021-11-22 PROCEDURE — 99396 PREV VISIT EST AGE 40-64: CPT | Performed by: FAMILY MEDICINE

## 2021-11-22 RX ORDER — ATORVASTATIN CALCIUM 10 MG/1
10 TABLET, FILM COATED ORAL DAILY
Qty: 90 TABLET | Refills: 1 | Status: SHIPPED | OUTPATIENT
Start: 2021-11-22 | End: 2022-05-22 | Stop reason: SDUPTHER

## 2021-11-22 RX ORDER — SILDENAFIL 100 MG/1
100 TABLET, FILM COATED ORAL DAILY PRN
Qty: 6 TABLET | Refills: 3 | Status: SHIPPED | OUTPATIENT
Start: 2021-11-22 | End: 2022-05-22 | Stop reason: SDUPTHER

## 2022-04-08 ENCOUNTER — TELEPHONE (OUTPATIENT)
Dept: FAMILY MEDICINE CLINIC | Facility: CLINIC | Age: 62
End: 2022-04-08

## 2022-04-08 NOTE — TELEPHONE ENCOUNTER
Patient is scheduled for a 6 mon check on 6/6  He had wanted to get his cholesterol checked before this visit but had wanted to know if it would be better to have you order the labs or if he should do the caring starts with you labs that include A1c and lipid panel  Please advise patient can be reached at [phone]

## 2022-04-11 NOTE — TELEPHONE ENCOUNTER
Left detailed message for pt letting him know he is not due for labwork until 6/8/22 which is actually two days after his scheduled appt, advised pt he can either reschedule for after 6/8 to be able to review his lab work at the appt or the labwork can be ordered at the appt  Advised pt he can get the caring starts with you labs as they do not have a time frame  Advised pt to return call with any questions

## 2022-04-11 NOTE — TELEPHONE ENCOUNTER
His blood work technically is not due until 6/8 which is 2 days after his scheduled appointment  If he wants to reschedule for after 6/8 he can and we can get everything  Otherwise he should disorder the caring starts with you labs which do not have a time frame

## 2022-05-22 DIAGNOSIS — N52.9 ERECTILE DYSFUNCTION, UNSPECIFIED ERECTILE DYSFUNCTION TYPE: ICD-10-CM

## 2022-05-22 DIAGNOSIS — E78.2 MIXED HYPERLIPIDEMIA: ICD-10-CM

## 2022-05-23 RX ORDER — ATORVASTATIN CALCIUM 10 MG/1
10 TABLET, FILM COATED ORAL DAILY
Qty: 90 TABLET | Refills: 0 | Status: SHIPPED | OUTPATIENT
Start: 2022-05-23

## 2022-05-23 RX ORDER — SILDENAFIL 100 MG/1
100 TABLET, FILM COATED ORAL DAILY PRN
Qty: 6 TABLET | Refills: 0 | Status: SHIPPED | OUTPATIENT
Start: 2022-05-23

## 2022-06-01 ENCOUNTER — APPOINTMENT (OUTPATIENT)
Dept: LAB | Facility: HOSPITAL | Age: 62
End: 2022-06-01

## 2022-06-01 DIAGNOSIS — Z00.8 HEALTH EXAMINATION IN POPULATION SURVEY: ICD-10-CM

## 2022-06-01 LAB
CHOLEST SERPL-MCNC: 246 MG/DL
EST. AVERAGE GLUCOSE BLD GHB EST-MCNC: 120 MG/DL
HBA1C MFR BLD: 5.8 %
HDLC SERPL-MCNC: 53 MG/DL
LDLC SERPL CALC-MCNC: 151 MG/DL (ref 0–100)
NONHDLC SERPL-MCNC: 193 MG/DL
TRIGL SERPL-MCNC: 209 MG/DL

## 2022-06-01 PROCEDURE — 80061 LIPID PANEL: CPT

## 2022-06-01 PROCEDURE — 83036 HEMOGLOBIN GLYCOSYLATED A1C: CPT

## 2022-06-01 PROCEDURE — 36415 COLL VENOUS BLD VENIPUNCTURE: CPT

## 2022-06-06 ENCOUNTER — OFFICE VISIT (OUTPATIENT)
Dept: FAMILY MEDICINE CLINIC | Facility: CLINIC | Age: 62
End: 2022-06-06
Payer: COMMERCIAL

## 2022-06-06 ENCOUNTER — HOSPITAL ENCOUNTER (OUTPATIENT)
Dept: RADIOLOGY | Facility: HOSPITAL | Age: 62
Discharge: HOME | End: 2022-06-06
Payer: COMMERCIAL

## 2022-06-06 VITALS
DIASTOLIC BLOOD PRESSURE: 80 MMHG | HEART RATE: 54 BPM | SYSTOLIC BLOOD PRESSURE: 138 MMHG | HEIGHT: 68 IN | RESPIRATION RATE: 16 BRPM | TEMPERATURE: 97.8 F | BODY MASS INDEX: 25.82 KG/M2 | WEIGHT: 170.4 LBS | OXYGEN SATURATION: 97 %

## 2022-06-06 DIAGNOSIS — M25.511 ACUTE PAIN OF RIGHT SHOULDER: Primary | ICD-10-CM

## 2022-06-06 DIAGNOSIS — L72.9 SKIN CYST: ICD-10-CM

## 2022-06-06 DIAGNOSIS — L30.9 ECZEMA, UNSPECIFIED TYPE: ICD-10-CM

## 2022-06-06 DIAGNOSIS — E78.5 HYPERLIPIDEMIA, UNSPECIFIED HYPERLIPIDEMIA TYPE: ICD-10-CM

## 2022-06-06 DIAGNOSIS — M25.511 ACUTE PAIN OF RIGHT SHOULDER: ICD-10-CM

## 2022-06-06 DIAGNOSIS — D17.1 LIPOMA OF TORSO: ICD-10-CM

## 2022-06-06 PROCEDURE — 73030 X-RAY EXAM OF SHOULDER: CPT

## 2022-06-06 PROCEDURE — 99214 OFFICE O/P EST MOD 30 MIN: CPT | Performed by: FAMILY MEDICINE

## 2022-06-06 RX ORDER — METHYLPREDNISOLONE 4 MG/1
TABLET ORAL
Qty: 1 EACH | Refills: 0 | Status: SHIPPED | OUTPATIENT
Start: 2022-06-06

## 2022-06-06 NOTE — PROGRESS NOTES
Assessment/Plan:       Diagnoses and all orders for this visit:    Acute pain of right shoulder  -     methylPREDNISolone 4 MG tablet therapy pack; Use as directed on package  -     XR shoulder 2+ vw right; Future    Skin cyst    Eczema, unspecified type    Hyperlipidemia, unspecified hyperlipidemia type    Lipoma of torso      steroid pack ordered x-ray of shoulder ordered  Will follow-up after x-ray  Medic orders own cream for his foot or his eczema is an symptoms are   Patient does have onychomycosis of his toenails we discussed this today   His cholesterol recently was checked and is not much better than it was despite being on Lipitor would consider either increasing his dosage or patient admits to having a very poor diet  Patient had a skin cyst on his cheek that was lanced  Will follow-up with further treatment if needed  Follow-up in 6 months or sooner if needed      Subjective:     Chief Complaint   Patient presents with    Follow-up     6 month follow-up  Started lipitor        Patient ID: Becky Montenegro is a 58 y o  male  Patient presents today for six-month check   He has a list of things to discuss  He is having right shoulder pain for 2 months he said this started after a long trip to [place]   Pain is mostly in the biceps tendon crease and worse with movement  He also has 3 growths on his face that started after wearing a mask  He has what he claims is athlete's foot on his feet   We also discussed his cholesterol which he recently got rechecked and is not much better than it was before starting Lipitor      The following portions of the patient's history were reviewed and updated as appropriate: allergies, current medications, past family history, past medical history, past social history, past surgical history and problem list     Review of Systems   Constitutional: Negative  HENT: Negative  Eyes: Negative  Respiratory: Negative  Cardiovascular: Negative      Gastrointestinal: Negative  Endocrine: Negative  Genitourinary: Negative  Musculoskeletal: Negative  Skin: Negative  Allergic/Immunologic: Negative  Neurological: Negative  Hematological: Negative  Psychiatric/Behavioral: Negative  All other systems reviewed and are negative  Objective:    Vitals:    06/06/22 1659   BP: 138/80   BP Location: Left arm   Patient Position: Sitting   Cuff Size: Standard   Pulse: (!) 54   Resp: 16   Temp: 97 8 °F (36 6 °C)   TempSrc: Tympanic   SpO2: 97%   Weight: 77 3 kg (170 lb 6 4 oz)   Height: 5' 7 72" (1 72 m)          Physical Exam  Vitals and nursing note reviewed  Constitutional:       General: He is not in acute distress  Appearance: He is well-developed  HENT:      Head: Normocephalic  Comments: Patient has a cystic like lesion on his left cheek as well as 1 on his nose     Right Ear: External ear normal       Left Ear: External ear normal       Nose: Nose normal    Eyes:      General:         Right eye: No discharge  Left eye: No discharge  Conjunctiva/sclera: Conjunctivae normal       Pupils: Pupils are equal, round, and reactive to light  Cardiovascular:      Rate and Rhythm: Normal rate and regular rhythm  Heart sounds: Normal heart sounds  Pulmonary:      Effort: Pulmonary effort is normal       Breath sounds: Normal breath sounds  Abdominal:      General: Bowel sounds are normal  There is no distension  Palpations: Abdomen is soft  Tenderness: There is no abdominal tenderness  Comments: Patient has a lipoma on his left abdomen under his ribcage it is about the size of a golfball   Musculoskeletal:         General: Normal range of motion  Cervical back: Normal range of motion  Skin:     General: Skin is warm and dry  Findings: No rash  Comments: Onychomycosis on both feet   Neurological:      Mental Status: He is alert and oriented to person, place, and time  Cranial Nerves:  No cranial nerve deficit  Psychiatric:         Behavior: Behavior normal          Thought Content: Thought content normal          Judgment: Judgment normal          BMI Counseling: Body mass index is 26 12 kg/m²  The BMI is above normal  Nutrition recommendations include reducing portion sizes, decreasing overall calorie intake, 3-5 servings of fruits/vegetables daily, reducing fast food intake, consuming healthier snacks, decreasing soda and/or juice intake, moderation in carbohydrate intake, increasing intake of lean protein, reducing intake of saturated fat and trans fat and reducing intake of cholesterol  Exercise recommendations include exercising 3-5 times per week

## 2022-06-06 NOTE — PATIENT INSTRUCTIONS

## 2022-08-14 DIAGNOSIS — E78.2 MIXED HYPERLIPIDEMIA: ICD-10-CM

## 2022-08-14 DIAGNOSIS — N52.9 ERECTILE DYSFUNCTION, UNSPECIFIED ERECTILE DYSFUNCTION TYPE: ICD-10-CM

## 2022-08-15 RX ORDER — ATORVASTATIN CALCIUM 10 MG/1
10 TABLET, FILM COATED ORAL DAILY
Qty: 90 TABLET | Refills: 0 | Status: SHIPPED | OUTPATIENT
Start: 2022-08-15

## 2022-08-15 RX ORDER — SILDENAFIL 100 MG/1
100 TABLET, FILM COATED ORAL DAILY PRN
Qty: 6 TABLET | Refills: 0 | Status: SHIPPED | OUTPATIENT
Start: 2022-08-15 | End: 2022-10-10 | Stop reason: SDUPTHER

## 2022-10-10 DIAGNOSIS — N52.9 ERECTILE DYSFUNCTION, UNSPECIFIED ERECTILE DYSFUNCTION TYPE: ICD-10-CM

## 2022-10-10 RX ORDER — SILDENAFIL 100 MG/1
100 TABLET, FILM COATED ORAL DAILY PRN
Qty: 6 TABLET | Refills: 0 | Status: SHIPPED | OUTPATIENT
Start: 2022-10-10

## 2022-11-13 DIAGNOSIS — E78.2 MIXED HYPERLIPIDEMIA: ICD-10-CM

## 2022-11-14 RX ORDER — ATORVASTATIN CALCIUM 10 MG/1
10 TABLET, FILM COATED ORAL DAILY
Qty: 90 TABLET | Refills: 0 | Status: SHIPPED | OUTPATIENT
Start: 2022-11-14

## 2022-12-22 DIAGNOSIS — N52.9 ERECTILE DYSFUNCTION, UNSPECIFIED ERECTILE DYSFUNCTION TYPE: ICD-10-CM

## 2022-12-23 RX ORDER — SILDENAFIL 100 MG/1
100 TABLET, FILM COATED ORAL DAILY PRN
Qty: 6 TABLET | Refills: 0 | Status: SHIPPED | OUTPATIENT
Start: 2022-12-23

## 2023-02-14 DIAGNOSIS — E78.2 MIXED HYPERLIPIDEMIA: ICD-10-CM

## 2023-02-14 RX ORDER — ATORVASTATIN CALCIUM 10 MG/1
10 TABLET, FILM COATED ORAL DAILY
Qty: 90 TABLET | Refills: 0 | Status: SHIPPED | OUTPATIENT
Start: 2023-02-14

## 2023-02-24 ENCOUNTER — OFFICE VISIT (OUTPATIENT)
Dept: DERMATOLOGY | Facility: CLINIC | Age: 63
End: 2023-02-24

## 2023-02-24 VITALS — TEMPERATURE: 97.9 F | WEIGHT: 169 LBS | BODY MASS INDEX: 25.61 KG/M2 | HEIGHT: 68 IN

## 2023-02-24 DIAGNOSIS — D22.9 MULTIPLE MELANOCYTIC NEVI: ICD-10-CM

## 2023-02-24 DIAGNOSIS — B35.3 TINEA PEDIS OF RIGHT FOOT: ICD-10-CM

## 2023-02-24 DIAGNOSIS — L82.1 SEBORRHEIC KERATOSIS: ICD-10-CM

## 2023-02-24 DIAGNOSIS — L81.4 LENTIGO: ICD-10-CM

## 2023-02-24 DIAGNOSIS — L91.8 ACROCHORDON: ICD-10-CM

## 2023-02-24 DIAGNOSIS — D18.01 CHERRY ANGIOMA: ICD-10-CM

## 2023-02-24 DIAGNOSIS — Z12.83 SCREENING FOR MALIGNANT NEOPLASM OF SKIN: ICD-10-CM

## 2023-02-24 DIAGNOSIS — Z85.820 HISTORY OF MELANOMA: Primary | ICD-10-CM

## 2023-02-24 DIAGNOSIS — L73.8 SEBACEOUS HYPERPLASIA: ICD-10-CM

## 2023-02-24 DIAGNOSIS — D23.9 DERMATOFIBROMA: ICD-10-CM

## 2023-02-24 NOTE — PROGRESS NOTES
[facility] [address] [facility]     Patient Name: Cherelle Hutchison  Encounter Date: 02/24/23     Have you been cared for by a [place] [address] Dermatologist in the last 3 years and, if so, which description applies to you? NO  I am considered a "new" patient and must complete all patient intake questions  I am MALE/not capable of bearing children  REVIEW OF SYSTEMS:  Have you recently had or currently have any of the following? · Recent fever or chills? No  · Any non-healing wound? No   PAST MEDICAL HISTORY:  Have you personally ever had or currently have any of the following? If "YES," then please provide more detail  · Skin cancer (such as Melanoma, Basal Cell Carcinoma, Squamous Cell Carcinoma? Yes-Melanoma on the left elbow  · Tuberculosis, HIV/AIDS, Hepatitis B or C: No  · Systemic Immunosuppression such as Diabetes, Biologic or Immunotherapy, Chemotherapy, Organ Transplantation, Bone Marrow Transplantation No  · Radiation Treatment No   FAMILY HISTORY:  Any "first degree relatives" (parent, brother, sister, or child) with the following? • Skin Cancer, Pancreatic or Other Cancer? No   PATIENT EXPERIENCE:    • Do you want the Dermatologist to perform a COMPLETE skin exam today including a clinical examination under the "bra and underwear" areas? Yes  • If necessary, do we have your permission to call and leave a detailed message on your Preferred Phone number that includes your specific medical information?   Yes      No Known Allergies   Current Outpatient Medications:   •  ascorbic acid (VITAMIN C) 500 mg tablet, Take 500 mg by mouth daily, Disp: , Rfl:   •  atorvastatin (LIPITOR) 10 mg tablet, Take 1 tablet (10 mg total) by mouth daily, Disp: 90 tablet, Rfl: 0  •  esomeprazole (NexIUM) 20 mg capsule, Take 20 mg by mouth as needed, Disp: , Rfl:   •  lutein 6 mg, Take 6 mg by mouth daily, Disp: , Rfl:   •  methylPREDNISolone 4 MG tablet therapy pack, Use as directed on package, Disp: 1 each, Rfl: 0  •  Multiple Vitamin (MULTIVITAMIN) capsule, Take 1 capsule by mouth daily, Disp: , Rfl:   •  Restasis 0 05 % ophthalmic emulsion, Administer 1 drop to both eyes 2 (two) times a day , Disp: , Rfl:   •  sildenafil (VIAGRA) 100 mg tablet, Take 1 tablet (100 mg total) by mouth daily as needed for erectile dysfunction, Disp: 6 tablet, Rfl: 0          • Whom besides the patient is providing clinical information about today's encounter?   o NO ADDITIONAL HISTORIAN (patient alone provided history)    Physical Exam and Assessment/Plan by Diagnosis:    HISTORY OF MELANOMA    Physical Exam:  • Anatomic Location Affected:  Left upper arm   • Morphological Description of Scar:  Well-healed scar  • Suspected Recurrence: no    Additional History of Present Condition:  Patient states it was a brown spot  Assessment and Plan:  Based on a thorough discussion of this condition and the management approach to it (including a comprehensive discussion of the known risks, side effects and potential benefits of treatment), the patient (family) agrees to implement the following specific plan:  • The nature of sun-induced photo-aging and skin cancers is discussed  Sun avoidance, protective clothing, and the use of 30-SPF sunscreens is advised  Observe closely for skin damage/changes, and call if such occurs  • Recommend 6 months to yearly skin exams     What happens at follow-up? The main purpose of follow-up is to detect recurrences early (metastatic melanoma), but it also offers an opportunity to diagnose a new primary melanoma at the first possible opportunity  A second invasive melanoma occurs in 5-10% of melanoma patients and a new melanoma in situ is diagnosed in more than 20% of melanoma patients  Our practice makes the following recommendations for follow-up for patients with invasive melanoma    • At-least "monthly" self-skin examinations   • Routine skin checks by a board certified dermatologist  • Follow-up intervals are "every 3 months" within 2 years of a new melanoma diagnosis; "every 6 months" between 2-4 years of a new melanoma diagnosis; and "annually" after 4 years of a new melanoma diagnosis  • Individual patient's needs should be considered before an appropriate follow-up is offered  • Provide education and support to help the patient adjust to their illness    Follow-up appointments should include:  • A check of the scar where the primary melanoma was removed  • Checking the regional lymph nodes  • A general skin examination  • A full physical examination at least annually by your primary care physician    In those with more advanced primary disease, follow-up may include:  • Blood tests  • Imaging: ultrasound, X-ray, CT, MRI and PET scan  Most tests are not worthwhile for patients with stage 1 or 2 melanoma unless there are signs or symptoms of disease recurrence or metastasis  No tests are necessary for healthy patients who have remained well for five years or longer after removal of their melanoma  What is the outlook for patients with melanoma? • Melanoma in situ is cured by excision because it has no potential to spread around the body  • The risk of spread and ultimate death from invasive melanoma depends on several factors, but the main one is the Breslow thickness of the melanoma at the time it was surgically removed  • Metastases are rare for melanomas < 0 75 mm and the risk for tumours 0 75-1 mm thick is about 5%  The risk steadily increases with thickness so that melanomas > 4 mm have a risk of metastasis of about 40%  Melanoma is a potentially serious type of skin cancer, in which there is uncontrolled growth of melanocytes (pigment cells)  Melanoma is sometimes called malignant melanoma  Normal melanocytes are found in the basal layer of the epidermis (the outer layer of skin)  Melanocytes produce a protein called melanin, which protects skin cells by absorbing ultraviolet (UV) radiation  Melanocytes are found in equal numbers in black and white skin, but melanocytes in black skin produce much more melanin  People with dark brown or black skin are very much less likely to be damaged by UV radiation than those with white skin  MELANOCYTIC NEVI ("Moles")    Physical Exam:  • Anatomic Location Affected:  Trunk and extremities  • Morphological Description:  Scattered, 1-4mm round to ovoid, symmetric and evenly bordered, regularly pigmented macules/papules without outliers other than if noted elsewhere in today's note  Additional History of Present Condition:  Discovered on skin exam      Assessment and Plan:  Based on a thorough discussion of this condition and the management approach to it (including a comprehensive discussion of the known risks, side effects and potential benefits of treatment), the patient (family) agrees to implement the following specific plan:    • The nature of sun-induced photo-aging and skin cancers is discussed  Sun avoidance, protective clothing, and the use of 30-SPF sunscreens is advised  Observe closely for skin damage/changes, and call if such occurs  • Recommend 6 months to yearly skin exams        Melanocytic Nevi  Melanocytic nevi ("moles") are tan or brown, raised or flat areas of the skin which have an increased number of melanocytes  Melanocytes are the cells in our body which make pigment and account for skin color  Some moles are present at birth (I e , "congenital nevi"), while others come up later in life (i e , "acquired nevi")  The sun can stimulate the body to make more moles  Sunburns are not the only thing that triggers more moles  Chronic sun exposure can do it too  Clinically distinguishing a healthy mole from melanoma may be difficult, even for experienced dermatologists  The "ABCDE's" of moles have been suggested as a means of helping to alert a person to a suspicious mole and the possible increased risk of melanoma    The suggestions for raising alert are as follows:    Asymmetry: Healthy moles tend to be symmetric, while melanomas are often asymmetric  Asymmetry means if you draw a line through the mole, the two halves do not match in color, size, shape, or surface texture  Asymmetry can be a result of rapid enlargement of a mole, the development of a raised area on a previously flat lesion, scaling, ulceration, bleeding or scabbing within the mole  Any mole that starts to demonstrate "asymmetry" should be examined promptly by a board certified dermatologist      Border: Healthy moles tend to have discrete, even borders  The border of a melanoma often blends into the normal skin and does not sharply delineate the mole from normal skin  Any mole that starts to demonstrate "uneven borders" should be examined promptly by a board certified dermatologist      Color: Healthy moles tend to be one color throughout  Melanomas tend to be made up of different colors ranging from dark black, blue, white, or red  Any mole that demonstrates a color change should be examined promptly by a board certified dermatologist      Diameter: Healthy moles tend to be smaller than 0 6 cm in size; an exception are "congenital nevi" that can be larger  Melanomas tend to grow and can often be greater than 0 6 cm (1/4 of an inch, or the size of a pencil eraser)  This is only a guideline, and many normal moles may be larger than 0 6 cm without being unhealthy  Any mole that starts to change in size (small to bigger or bigger to smaller) should be examined promptly by a board certified dermatologist      Evolving: Healthy moles tend to "stay the same "  Melanomas may often show signs of change or evolution such as a change in size, shape, color, or elevation    Any mole that starts to itch, bleed, crust, burn, hurt, or ulcerate or demonstrate a change or evolution should be examined promptly by a board certified dermatologist         LENTIGO    Physical Exam:  • Anatomic Location Affected:  Sun exposed areas  • Morphological Description:  Omayra Mendez well demarcated macules on sun exposed skin with reassuring dermoscopy    Additional History of Present Condition:  Discovered on skin exam     Assessment and Plan:  Based on a thorough discussion of this condition and the management approach to it (including a comprehensive discussion of the known risks, side effects and potential benefits of treatment), the patient (family) agrees to implement the following specific plan:    • The nature of sun-induced photo-aging and skin cancers is discussed  Sun avoidance, protective clothing, and the use of 30-SPF sunscreens is advised  Observe closely for skin damage/changes, and call if such occurs  • Recommend 6 months to yearly skin exams     What is a lentigo? A lentigo is a pigmented flat or slightly raised lesion with a clearly defined edge  Unlike an ephelis (freckle), it does not fade in the winter months  There are several kinds of lentigo  The name lentigo originally referred to its appearance resembling a small lentil  The plural of lentigo is lentigines, although “lentigos” is also in common use  Who gets lentigines? Lentigines can affect males and females of all ages and races  Solar lentigines are especially prevalent in fair skinned adults  Lentigines associated with syndromes are present at birth or arise during childhood  What causes lentigines? Common forms of lentigo are due to exposure to ultraviolet radiation:  • Sun damage including sunburn   • Indoor tanning   • Phototherapy, especially photochemotherapy (PUVA)    Ionizing radiation, eg radiation therapy, can also cause lentigines  Several familial syndromes associated with widespread lentigines originate from mutations in Prasanna-MAP kinase, mTOR signaling and PTEN pathways  What is the treatment for lentigines? Most lentigines are left alone  Attempts to lighten them may not be successful  The following approaches are used:  • SPF 50+ broad-spectrum sunscreen   • Hydroquinone bleaching cream   • Alpha hydroxy acids   • Vitamin C   • Retinoids   • Azelaic acid   • Chemical peels  Individual lesions can be permanently removed using:  • Cryotherapy   • Intense pulsed light   • Pigment lasers    How can lentigines be prevented? Lentigines associated with exposure ultraviolet radiation can be prevented by very careful sun protection  Clothing is more successful at preventing new lentigines than are sunscreens  What is the outlook for lentigines? Lentigines usually persist  They may increase in number with age and sun exposure  Some in sun-protected sites may fade and disappear  SANDERS ANGIOMAS    Physical Exam:  • Anatomic Location Affected:  Trunk  • Morphological Description:  Scattered cherry red, variably sized papules  Additional History of Present Condition:  Discovered on skin exam     Assessment and Plan:  Based on a thorough discussion of this condition and the management approach to it (including a comprehensive discussion of the known risks, side effects and potential benefits of treatment), the patient (family) agrees to implement the following specific plan:  • Monitor for changes  • Benign, reassured    Assessment and Plan:    Cherry angioma, also known as Mindy Loth spots, are benign vascular skin lesions  A "cherry angioma" is a firm red, blue or purple papule, 0 1-1 cm in diameter  When thrombosed, they can appear black in colour until evaluated with a dermatoscope when the red or purple colour is more easily seen  Cherry angioma may develop on any part of the body but most often appear on the scalp, face, lips and trunk  An angioma is due to proliferating endothelial cells; these are the cells that line the inside of a blood vessel  Angiomas can arise in early life or later in life; the most common type of angioma is a cherry angioma    Cherry angiomas are very common in males and females of any age or race  They are more noticeable in white skin than in skin of colour  They markedly increase in number from about the age of 36  There may be a family history of similar lesions  Eruptive cherry angiomas have been rarely reported to be associated with internal malignancy  The cause of angiomas is unknown  Genetic analysis of cherry angiomas has shown that they frequently carry specific somatic missense mutations in the GNAQ and GNA11 (Q209H) genes, which are involved in other vascular and melanocytic proliferations  SEBORRHEIC KERATOSIS; NON-INFLAMED    Physical Exam:  • Anatomic Location Affected:  Trunk  • Morphological Description: Tan waxy variably sized "stuck-on" appearing epidermal papules with reassuring dermoscopy    Additional History of Present Condition:  Discovered on skin exam     Assessment and Plan:  Based on a thorough discussion of this condition and the management approach to it (including a comprehensive discussion of the known risks, side effects and potential benefits of treatment), the patient (family) agrees to implement the following specific plan:    • Monitor for changes  • Benign, reassured    Seborrheic Keratosis  A seborrheic keratosis is a harmless warty spot that appears during adult life as a common sign of skin aging  Seborrheic keratoses can arise on any area of skin, covered or uncovered, with the usual exception of the palms and soles  They do not arise from mucous membranes  Seborrheic keratoses can have highly variable appearance  Seborrheic keratoses are extremely common  It has been estimated that over 90% of adults over the age of 61 years have one or more of them  They occur in males and females of all races, typically beginning to erupt in the 35s or 45s  They are uncommon under the age of 21 years  The precise cause of seborrhoeic keratoses is not known  Seborrhoeic keratoses are considered degenerative in nature   As time goes by, seborrheic keratoses tend to become more numerous  Some people inherit a tendency to develop a very large number of them; some people may have hundreds of them  There is no easy way to remove multiple lesions on a single occasion  Unless a specific lesion is "inflamed" and is causing pain or stinging/burning or is bleeding, most insurance companies do not authorize treatment  ACROCHORDON ("SKIN TAG")    Physical Exam:  • Anatomic Location Affected:  Right axilla  • Morphological Description:  Scattered skin colored tag papules    Additional History of Present Condition:  Discovered on skin exam   Assessment and Plan:  Based on a thorough discussion of this condition and the management approach to it (including a comprehensive discussion of the known risks, side effects and potential benefits of treatment), the patient (family) agrees to implement the following specific plan:  • Assured benign    Skin tags are common, soft, harmless skin lesions that are also called, in the appropriate settings, papillomas, fibroepithelial polyps, and soft fibromas  They are made up of loosely arranged collagen fibers and blood vessels surrounded by a thickened or thinned-out epidermis  Skin tags tend to develop in both men and women as we grow older  They are usually found on the skin folds (neck, armpits, groin)  It is not known what specifically causes skin tags  Certain factors, though, do appear to play a role:  • Chaffing and irritation from skin rubbing together  • High levels of growth factors (as seen, for example, in pregnancy or in acromegaly/gigantism)  • Insulin resistance  • Human papillomavirus (wart virus)    We discussed that most skin tags do not need to be treated unless they are specifically causing the patient physical distress or limitation or pose a risk for a larger problem such as an infection that forms secondary to excoriation or chronic irritation      We had a thorough discussion of treatment options and specific risks (including that any procedural treatment may not be covered by insurance and would then be the patient's responsibility) and benefits/alternatives including but not limited to the following:  • Cryotherapy (freezing)  • Shave removal  • Surgical excision (snip excision with scissors)  • Electrosurgery  • Ligation (we do not do this procedure and counseled against it due to risk of tissue necrosis and infection)    SEBACEOUS HYPERPLASIA    Physical Exam:  • Anatomic Location Affected:  Right cheek  • Morphological Description:  Yellowish papules     Additional History of Present Condition:  Noticed during today's skin exam    Assessment and Plan:  Based on a thorough discussion of this condition and the management approach to it (including a comprehensive discussion of the known risks, side effects and potential benefits of treatment), the patient (family) agrees to implement the following specific plan:    • Assured benign  • Continue to monitor     Sebaceous Hyperplasia  Sebaceous hyperplasia is a common, benign condition of enlarged oil secreting (sebaceous) glands commonly found on the forehead and cheeks of middle-aged and elderly patients  They normally appear as small yellow bumps up to 3mm in diameter that can be single or multiple  The bumps on the face often display a centrall dell  Occasionally, these bumps can occur on the chest, areola, mouth, and genitals  Rarely, they can grow to take a giant form, or be arranged linearly  Causes of sebaceous hyperplasia  Sebaceous hyperplasic is a form of benign hair follicle tumor and can often be confused with basal cell carcinoma  It can be more prevalent in immunosuppressed patients such as those undergoing organ transplantation  In the rare Judah-Dinesh syndrome, sebaceous hyperplasia occurs in association with internal cancers    Lesions of sebaceous hyperplasia are benign, with no known potential for malignant transformation, but they may be associated with nonmelanoma skin cancer in transplantation patients  How we do diagnose sebaceous hyperplasia? Your dermatologist may take a closer look at the bumps with a device called a dermatoscope  Common features include a central hair follicle surrounded by yellowish lobules with prominent blood vessels  What is the treatment of sebaceous hyperplasia? Since sebaceous hyperplasia is benign with no known potential for transformation into cancer, treatment is mostly for cosmetic reasons or if the lesions become irritated  Options include   - Light electrocautery or laser vaporization  - Oral isotrentinoin is effective for extensive or disfiguring spots, but do not prevent recurrence   - Antiandrogens may be used in females to decrease the size and improve overall appearance of bumps     DERMATOFIBROMA    Physical Exam:  • Anatomic Location Affected:  Left lateral leg  • Morphological Description:  Firm rubbery pinkish brown dermal nodule    Additional History of Present Condition:  Noticed during today's skin exam    Assessment and Plan:  Based on a thorough discussion of this condition and the management approach to it (including a comprehensive discussion of the known risks, side effects and potential benefits of treatment), the patient (family) agrees to implement the following specific plan:    • Assured benign  • Continue to monitor     Assessment and Plan:  A dermatofibroma is a common benign fibrous nodule that most often arises on the skin of the lower legs  A dermatofibroma is also called a "cutaneous fibrous histiocytoma "  Dermatofibromas occur at all ages and in people of every ethnicity  They are more common in women than in men  It is not clear if dermatofibroma is a reactive process or if it is a neoplasm  The lesions are made up of proliferating fibroblasts  Histiocytes may also be involved    They are sometimes attributed to an insect bite or ingrownhair or local trauma, but not consistently  They may be more numerous in patients with altered immunity  Dermatofibromas most often occur on the legs and arms, but may also arise on the trunk or any site of the body  Typical clinical features include the following:  • People may have 1 or up to 15 lesions  • Size varies from 0 5-1 5 cm diameter; most lesions are 7-10 mm diameter  • They are firm nodules tethered to the skin surface and mobile over subcutaneous tissue  • The skin "dimples" on pinching the lesion  • Color may be pink to light brown in white skin, and dark brown to black in dark skin; some appear paler in the center  • They do not usually cause symptoms, but they are sometimes painful or itchy  • Because they are often raised lesions, they may be traumatized, for example by a razor  • Occasionally dozens may erupt within a few months, usually in the setting of immunosuppression (for example autoimmune disease, cancer or certain medications)  • Dermatofibroma does not give rise to cancer  However, occasionally, it may be mistaken for dermatofibrosarcoma or desmoplastic melanoma  A dermatofibroma is harmless and seldom causes any symptoms  Usually, only reassurance is needed  If it is nuisance or causing concern, the lesion can be removed surgically, resulting in a scar that is, by definition, usually longer in diameter than the widest portion of the dermatofibroma  Cryotherapy, shave biopsy and laser surgery are rarely completely successful    Skin punch biopsy or incisional biopsy may be undertaken if there is an atypical feature such as recent enlargement, ulceration, or asymmetrical structures and colours on dermatoscopy    TINEA PEDIS ("ATHLETE'S FOOT")  ONYCHOMYCOSIS     Physical Exam:  • Anatomic Location Affected:  Right foot   • Morphological Description:  Yellow nails with subungual hyperkeratosis; red scaly plaques     Additional History of Present Condition:  Patient has been applying antifungal spray and hydrocortisone    Assessment and Plan:  Based on a thorough discussion of this condition and the management approach to it (including a comprehensive discussion of the known risks, side effects and potential benefits of treatment), the patient (family) agrees to implement the following specific plan:  • Instructed patient to stop hydrocortisone and start applying OTC lamisil (terbinafine) cream twice daily until rash resolves  • Did not want to consider oral terbinafine for onychomycosis at this time     Tinea Pedis  Tinea pedis is a fungal infection of the foot and is in fact the most common fungal infection  Tinea pedis is caused by dermatophyte fungi with the three most common being Trichophyton (T ) rubrum, T  interdigitale and Epidermophyton floccosum  Tinea pedis most commonly involves the interdigital spaces, known as "athlete's foot " Other typical sites include the toenails, groin, and palms of the hands  There are four major manifestations of tinea pedis including chronic hyperkeratotic, chronic intertriginous, acute ulcerative and vesicobullous  Signs and symptoms include:   • Itchiness, redness, and scaling between the toes  • Scales covering the soles and sides of the feet  • Blisters over the inner aspect of the feet    It is particularly common in hot, tropical, and urban environments where sweating in the feet facilitate fungal growth  Risk factors for development include:  • Occlusive footwear  • Excessive swearing  • Diabetes or other underlying immunosuppression   • Poor peripheral circulation     The diagnosis of tinea pedis can usually be made via good history and physical exam due to its characteristic clinical features  Diagnosis can be confirmed by examining skin scrapings under the microscope  Cultures are occasionally done but may not be necessary if fungi are seen under microscopy       Other diagnoses to consider if patients do not respond to therapy include psoriasis, contact dermatitis, and eczema  Tinea pedis can be treated with topical antifungal drugs applied to affected areas on a repeated basis (usually 2 twice a day) for 2 to 4 weeks  Common topical medications include topical ketoconazole, allylamines, butenafine, ciclopirox, and tolnaftate  In cases that do not respond to topical therapy, oral antifungal agents may be used which include terbinafine, itraconazole, fluconazole and griseofulvin  These oral agents are also used to treat tinea capitis (fungal infection of the scalp) and onychomycosis (fungal infection of the nails)  Those with pre-existing liver problems are usually screened for liver function prior to starting oral terbinafine  Tinea pedis can be prevented by making sure feet are clean and dry with protective footwear worn in communal facilities  Other recommendations are:  • Using drying foot powders when wearing occlusive shoes   • Thoroughly dry shoes and boots prior to wearing them   • Making sure to clean contaminated bathroom floors with bleach   • Treatment of family members and other close contacts      NEOPLASM OF UNCERTAIN BEHAVIOR OF SKIN    Physical Exam:  • (Anatomic Location); (Size and Morphological Description); (Differential Diagnosis):  o Specimen A: Left lower cheek skin (has blue dot next to it in photo); 0 5 cm pink papule; DDX:  Benign nevus vs basal cell             Additional History of Present Condition:  Noticed during today's skin exam; patient unsure if changing    Assessment and Plan:  • I have discussed with the patient that a sample of skin via a "skin biopsy” would be potentially helpful to further make a specific diagnosis under the microscope    • Based on a thorough discussion of this condition and the management approach to it (including a comprehensive discussion of the known risks, side effects and potential benefits of treatment), the patient (family) agrees to implement the following specific plan:    • Coming back for biopsy on 03/28/23 in [facility] with Dr Shashank Raymond,:  Erika Campbell am acting as a scribe while in the presence of the attending physician :       I,:  Reagan Gallardo MD personally performed the services described in this documentation    as scribed in my presence :         The patient was seen and discussed with Dr Artem Barillas       RTC: Biopsy of cheek lesion on 03/28/23 in [facility] with Dr Elda Mota  Dermatology PGY-4 Resident Physician

## 2023-02-24 NOTE — PATIENT INSTRUCTIONS
MELANOCYTIC NEVI ("Moles")    Assessment and Plan:  Based on a thorough discussion of this condition and the management approach to it (including a comprehensive discussion of the known risks, side effects and potential benefits of treatment), the patient (family) agrees to implement the following specific plan:  The patient was encouraged to use an SPF30+ broad spectrum sunscreen daily and re-apply every 2-3 outdoors while outside  The importance of sun protection, self-skin exams, and sun avoidance was emphasized  An annual full body skin exam is recommended, and the patient was encouraged to return to the office sooner for any new or changing lesions of concerns  Benign, reassured  Annual skin check     Melanocytic Nevi  Melanocytic nevi ("moles") are tan or brown, raised or flat areas of the skin which have an increased number of melanocytes  Melanocytes are the cells in our body which make pigment and account for skin color  Some moles are present at birth (I e , "congenital nevi"), while others come up later in life (i e , "acquired nevi")  The sun can stimulate the body to make more moles  Sunburns are not the only thing that triggers more moles  Chronic sun exposure can do it too  Clinically distinguishing a healthy mole from melanoma may be difficult, even for experienced dermatologists  The "ABCDE's" of moles have been suggested as a means of helping to alert a person to a suspicious mole and the possible increased risk of melanoma  The suggestions for raising alert are as follows:    Asymmetry: Healthy moles tend to be symmetric, while melanomas are often asymmetric  Asymmetry means if you draw a line through the mole, the two halves do not match in color, size, shape, or surface texture  Asymmetry can be a result of rapid enlargement of a mole, the development of a raised area on a previously flat lesion, scaling, ulceration, bleeding or scabbing within the mole    Any mole that starts to demonstrate "asymmetry" should be examined promptly by a board certified dermatologist      Border: Healthy moles tend to have discrete, even borders  The border of a melanoma often blends into the normal skin and does not sharply delineate the mole from normal skin  Any mole that starts to demonstrate "uneven borders" should be examined promptly by a board certified dermatologist      Color: Healthy moles tend to be one color throughout  Melanomas tend to be made up of different colors ranging from dark black, blue, white, or red  Any mole that demonstrates a color change should be examined promptly by a board certified dermatologist      Diameter: Healthy moles tend to be smaller than 0 6 cm in size; an exception are "congenital nevi" that can be larger  Melanomas tend to grow and can often be greater than 0 6 cm (1/4 of an inch, or the size of a pencil eraser)  This is only a guideline, and many normal moles may be larger than 0 6 cm without being unhealthy  Any mole that starts to change in size (small to bigger or bigger to smaller) should be examined promptly by a board certified dermatologist      Evolving: Healthy moles tend to "stay the same "  Melanomas may often show signs of change or evolution such as a change in size, shape, color, or elevation    Any mole that starts to itch, bleed, crust, burn, hurt, or ulcerate or demonstrate a change or evolution should be examined promptly by a board certified dermatologist         Abhishek Law and Plan:  Based on a thorough discussion of this condition and the management approach to it (including a comprehensive discussion of the known risks, side effects and potential benefits of treatment), the patient (family) agrees to implement the following specific plan:  When outside we recommend using a wide brim hat, sunglasses, long sleeve and pants, sunscreen with SPF 30+ with reapplication every 2 hours, or SPF specific clothing       What is a lentigo? A lentigo is a pigmented flat or slightly raised lesion with a clearly defined edge  Unlike an ephelis (freckle), it does not fade in the winter months  There are several kinds of lentigo  The name lentigo originally referred to its appearance resembling a small lentil  The plural of lentigo is lentigines, although “lentigos” is also in common use  Who gets lentigines? Lentigines can affect males and females of all ages and races  Solar lentigines are especially prevalent in fair skinned adults  Lentigines associated with syndromes are present at birth or arise during childhood  What causes lentigines? Common forms of lentigo are due to exposure to ultraviolet radiation:  Sun damage including sunburn   Indoor tanning   Phototherapy, especially photochemotherapy (PUVA)    Ionizing radiation, eg radiation therapy, can also cause lentigines  Several familial syndromes associated with widespread lentigines originate from mutations in Prasanna-MAP kinase, mTOR signaling and PTEN pathways  What is the treatment for lentigines? Most lentigines are left alone  Attempts to lighten them may not be successful  The following approaches are used:  SPF 50+ broad-spectrum sunscreen   Hydroquinone bleaching cream   Alpha hydroxy acids   Vitamin C   Retinoids   Azelaic acid   Chemical peels  Individual lesions can be permanently removed using:  Cryotherapy   Intense pulsed light   Pigment lasers    How can lentigines be prevented? Lentigines associated with exposure ultraviolet radiation can be prevented by very careful sun protection  Clothing is more successful at preventing new lentigines than are sunscreens  What is the outlook for lentigines? Lentigines usually persist  They may increase in number with age and sun exposure  Some in sun-protected sites may fade and disappear      SANDERS ANGIOMAS    Assessment and Plan:  Based on a thorough discussion of this condition and the management approach to it (including a comprehensive discussion of the known risks, side effects and potential benefits of treatment), the patient (family) agrees to implement the following specific plan:  Monitor for changes  Benign, reassured      Assessment and Plan:    Cherry angioma, also known as Amanda Diesel spots, are benign vascular skin lesions  A "cherry angioma" is a firm red, blue or purple papule, 0 1-1 cm in diameter  When thrombosed, they can appear black in colour until evaluated with a dermatoscope when the red or purple colour is more easily seen  Cherry angioma may develop on any part of the body but most often appear on the scalp, face, lips and trunk  An angioma is due to proliferating endothelial cells; these are the cells that line the inside of a blood vessel  Angiomas can arise in early life or later in life; the most common type of angioma is a cherry angioma  Cherry angiomas are very common in males and females of any age or race  They are more noticeable in white skin than in skin of colour  They markedly increase in number from about the age of 36  There may be a family history of similar lesions  Eruptive cherry angiomas have been rarely reported to be associated with internal malignancy  The cause of angiomas is unknown  Genetic analysis of cherry angiomas has shown that they frequently carry specific somatic missense mutations in the GNAQ and GNA11 (Q209H) genes, which are involved in other vascular and melanocytic proliferations        SEBORRHEIC KERATOSIS; NON-INFLAMED    Assessment and Plan:  Based on a thorough discussion of this condition and the management approach to it (including a comprehensive discussion of the known risks, side effects and potential benefits of treatment), the patient (family) agrees to implement the following specific plan:  Monitor for changes  Benign, reassured      Seborrheic Keratosis  A seborrheic keratosis is a harmless warty spot that appears during adult life as a common sign of skin aging  Seborrheic keratoses can arise on any area of skin, covered or uncovered, with the usual exception of the palms and soles  They do not arise from mucous membranes  Seborrheic keratoses can have highly variable appearance  Seborrheic keratoses are extremely common  It has been estimated that over 90% of adults over the age of 61 years have one or more of them  They occur in males and females of all races, typically beginning to erupt in the 35s or 45s  They are uncommon under the age of 21 years  The precise cause of seborrhoeic keratoses is not known  Seborrhoeic keratoses are considered degenerative in nature  As time goes by, seborrheic keratoses tend to become more numerous  Some people inherit a tendency to develop a very large number of them; some people may have hundreds of them  There is no easy way to remove multiple lesions on a single occasion  Unless a specific lesion is "inflamed" and is causing pain or stinging/burning or is bleeding, most insurance companies do not authorize treatment  ACROCHORDON ("SKIN TAG")    Assessment and Plan:  Based on a thorough discussion of this condition and the management approach to it (including a comprehensive discussion of the known risks, side effects and potential benefits of treatment), the patient (family) agrees to implement the following specific plan:  Assured benign    Skin tags are common, soft, harmless skin lesions that are also called, in the appropriate settings, papillomas, fibroepithelial polyps, and soft fibromas  They are made up of loosely arranged collagen fibers and blood vessels surrounded by a thickened or thinned-out epidermis  Skin tags tend to develop in both men and women as we grow older  They are usually found on the skin folds (neck, armpits, groin)  It is not known what specifically causes skin tags    Certain factors, though, do appear to play a role:  Chaffing and irritation from skin rubbing together  High levels of growth factors (as seen, for example, in pregnancy or in acromegaly/gigantism)  Insulin resistance  Human papillomavirus (wart virus)    We discussed that most skin tags do not need to be treated unless they are specifically causing the patient physical distress or limitation or pose a risk for a larger problem such as an infection that forms secondary to excoriation or chronic irritation  We had a thorough discussion of treatment options and specific risks (including that any procedural treatment may not be covered by insurance and would then be the patient's responsibility) and benefits/alternatives including but not limited to the following:  Cryotherapy (freezing)  Shave removal  Surgical excision (snip excision with scissors)  Electrosurgery  Ligation (we do not do this procedure and counseled against it due to risk of tissue necrosis and infection)    SEBACEOUS HYPERPLASIA    Assessment and Plan:  Based on a thorough discussion of this condition and the management approach to it (including a comprehensive discussion of the known risks, side effects and potential benefits of treatment), the patient (family) agrees to implement the following specific plan:  Assured benign    Sebaceous Hyperplasia  Sebaceous hyperplasia is a common, benign condition of enlarged oil secreting (sebaceous) glands commonly found on the forehead and cheeks of middle-aged and elderly patients  They normally appear as small yellow bumps up to 3mm in diameter that can be single or multiple  The bumps on the face often display a centrall dell  Occasionally, these bumps can occur on the chest, areola, mouth, and genitals  Rarely, they can grow to take a giant form, or be arranged linearly  Causes of sebaceous hyperplasia  Sebaceous hyperplasic is a form of benign hair follicle tumor and can often be confused with basal cell carcinoma   It can be more prevalent in immunosuppressed patients such as those undergoing organ transplantation  In the rare Judah-Dinesh syndrome, sebaceous hyperplasia occurs in association with internal cancers  Lesions of sebaceous hyperplasia are benign, with no known potential for malignant transformation, but they may be associated with nonmelanoma skin cancer in transplantation patients  How we do diagnose sebaceous hyperplasia? Your dermatologist may take a closer look at the bumps with a device called a dermatoscope  Common features include a central hair follicle surrounded by yellowish lobules with prominent blood vessels  What is the treatment of sebaceous hyperplasia? Since sebaceous hyperplasia is benign with no known potential for transformation into cancer, treatment is mostly for cosmetic reasons or if the lesions become irritated  Options include   Light electrocautery or laser vaporization  Oral isotrentinoin is effective for extensive or disfiguring spots, but do not prevent recurrence   Antiandrogens may be used in females to decrease the size and improve overall appearance of bumps     DERMATOFIBROMA    Assessment and Plan:  Based on a thorough discussion of this condition and the management approach to it (including a comprehensive discussion of the known risks, side effects and potential benefits of treatment), the patient (family) agrees to implement the following specific plan:  Assured benign    Assessment and Plan:  A dermatofibroma is a common benign fibrous nodule that most often arises on the skin of the lower legs  A dermatofibroma is also called a "cutaneous fibrous histiocytoma "  Dermatofibromas occur at all ages and in people of every ethnicity  They are more common in women than in men  It is not clear if dermatofibroma is a reactive process or if it is a neoplasm  The lesions are made up of proliferating fibroblasts  Histiocytes may also be involved    They are sometimes attributed to an insect bite or ingrownhair or local trauma, but not consistently  They may be more numerous in patients with altered immunity  Dermatofibromas most often occur on the legs and arms, but may also arise on the trunk or any site of the body  Typical clinical features include the following:  People may have 1 or up to 15 lesions  Size varies from 0 5-1 5 cm diameter; most lesions are 7-10 mm diameter  They are firm nodules tethered to the skin surface and mobile over subcutaneous tissue  The skin "dimples" on pinching the lesion  Color may be pink to light brown in white skin, and dark brown to black in dark skin; some appear paler in the center  They do not usually cause symptoms, but they are sometimes painful or itchy  Because they are often raised lesions, they may be traumatized, for example by a razor  Occasionally dozens may erupt within a few months, usually in the setting of immunosuppression (for example autoimmune disease, cancer or certain medications)  Dermatofibroma does not give rise to cancer  However, occasionally, it may be mistaken for dermatofibrosarcoma or desmoplastic melanoma  A dermatofibroma is harmless and seldom causes any symptoms  Usually, only reassurance is needed  If it is nuisance or causing concern, the lesion can be removed surgically, resulting in a scar that is, by definition, usually longer in diameter than the widest portion of the dermatofibroma  Cryotherapy, shave biopsy and laser surgery are rarely completely successful  Skin punch biopsy or incisional biopsy may be undertaken if there is an atypical feature such as recent enlargement, ulceration, or asymmetrical structures and colours on dermatoscopy      ONYCHOMYCOSIS ("FUNGAL NAIL")    Assessment and Plan:  Based on a thorough discussion of this condition and the management approach to it (including a comprehensive discussion of the known risks, side effects and potential benefits of treatment), the patient (family) agrees to implement the following specific plan:  Use Lamisil twice a day    What are fungal nail infections? Fungal infection of the nails is also known as onychomycosis  It is increasingly common with increased age  It rarely affects children  Which organisms cause onychomycosis? Onychomycosis can be due to:  Dermatophytes such as Trichophyton rubrum (T  rubrum), T  interdigitale (tinea unguium)   Yeasts such as Candida albicans   Molds such as Scopulariopsis brevicaulis and Fusarium species  What are the clinical features of onychomycosis? Onychomycosis may affect one or more toenails and fingernails and most often involves the great toenail or the little toenail  It can present in one or several different patterns  Lateral onychomycosis: a white or yellow opaque streak appears at one side of the nail  Subungual hyperkeratosis: scaling occurs under the nail  Distal onycholysis: the end of the nail lifts  The free edge often crumbles  Superficial white onychomycosis: flaky white patches and pits appear on the top of the nail plate  Proximal onychomycosis:yellow spots appear in the half-moon (lunula)  Onychoma or dermatophytoma:a thick localized area of infection in the nail plate   Destruction of the nail    Tinea unguium often results from untreated tinea pedis (feet) or tinea manuum (hand)  It may follow an injury to the nail or inflammatory disease of the nail  Candida infection of the nail plate generally results from paronychia and starts near the nail fold (the cuticle)  The nail fold is swollen and red, lifted off the nail plate  White, yellow, green or black marks appear on the nearby nail and spread  The nail may lift off its bed and is tender if you press on it  Mold infections are similar in appearance to tinea unguium  Onychomycosis must be distinguished from other nail disorders    Bacterial infection especially Pseudomonas aeruginosa, which turns the nail black or green   Psoriasis   Eczema or dermatitis Lichen planus   Viral warts   Onycholysis   Onychogryphosis (nail thickening and scaling under the nail), common in the elderly    How is the diagnosis of onychomycosis confirmed? Clippings should be taken from crumbling tissue at the end of the infected nail  The discolored surface of the nails can be scraped off  The debris can be scooped out from under the nail  The clippings and scrapings are sent to a mycology laboratory for microscopy and culture  Previous treatment can reduce the chance of growing the fungus successfully in a culture, so it is best to take the clippings before any treatment is commenced: To confirm the diagnosis -- antifungal treatment will not be successful if there is another explanation for the nail condition   To identify the responsible organism  Molds and yeasts may require different treatment from dermatophyte fungi   Treatment may be required for a prolonged period and is expensive  Partially treated infection may be impossible to prove for many months as antifungal drugs can be detected even a year later  A nail biopsy may also reveal characteristic histopathological features of onychomycosis  What is the treatment of onychomycosis? Fingernail infections are usually cured more quickly and effectively than toenail infections  Mild infections affecting less than 50% of one or two nails may respond to topical antifungal medications, but cure usually requires an oral antifungal medication for several months  Devices used to treat onychomycosis  Recently, non-drug treatment has been developed to treat onychomycosis thus avoiding the side effects and risks of oral antifungal drugs  Lasers emitting infrared radiation are thought to kill fungi by the production of heat within the infected tissue  Laser treatment is reported to safely eradicate nail fungi with one to three, almost painless, sessions   Several lasers have been approved for this purpose by the FDA and other regulatory authorities  However, high-quality studies of efficacy are lacking, and existing studies indicate that laser treatment is less medically effective than topical or oral antifungal agents  Nd:YAG continuous, long or short-pulsed lasers   Ti:Sapphire modelocked laser   Diode laser    NEOPLASM OF UNCERTAIN BEHAVIOR OF SKIN    Assessment and Plan:  I have discussed with the patient that a sample of skin via a "skin biopsy” would be potentially helpful to further make a specific diagnosis under the microscope    Based on a thorough discussion of this condition and the management approach to it (including a comprehensive discussion of the known risks, side effects and potential benefits of treatment), the patient (family) agrees to implement the following specific plan:  Coming back for biopsy of lesion on 03/38/23 in [facility] with Dr Ilana Jacobs

## 2023-03-17 ENCOUNTER — TELEPHONE (OUTPATIENT)
Dept: DERMATOLOGY | Facility: CLINIC | Age: 63
End: 2023-03-17

## 2023-03-17 NOTE — TELEPHONE ENCOUNTER
Called pt and spoke with him about the Bx  Pt was curious if the spot on his hip could be removed on that day as well  Pt informed me that the spot on his hip was called a Seborrheic keratosis; was also documented in the last office note  Informed pt that the lesion removal would be considered a cosmetic removal  Informed him that it would need to be a separate appointment and would cost $250 as it is not covered by insurance  Pt asked why it is not covered by [employer] and I informed him that the lesion is benign and that there is no medical reason that it should be removed  Asked if pt would like to schedule that appt but he said he will think about it and then schedule that appt when he comes in next  Pt had no further questions at this time

## 2023-03-17 NOTE — TELEPHONE ENCOUNTER
Received call from patient regarding his upcoming appt with Dr Klarissa Rincon  Pt is coming in for a biopsy of lesion on his cheek  He would like to know if another lesion can be removed during that visit  It is a spot on his R side, that lies under his belt area  It gets very irritated and he is asking if this would be covered under his insurance  Please call patient to advise

## 2023-03-28 ENCOUNTER — PROCEDURE VISIT (OUTPATIENT)
Dept: DERMATOLOGY | Facility: CLINIC | Age: 63
End: 2023-03-28

## 2023-03-28 VITALS — BODY MASS INDEX: 25.01 KG/M2 | WEIGHT: 165 LBS | HEIGHT: 68 IN

## 2023-03-28 DIAGNOSIS — D48.5 NEOPLASM OF UNCERTAIN BEHAVIOR OF SKIN: Primary | ICD-10-CM

## 2023-03-28 NOTE — PROGRESS NOTES
"[facility] Note     Patient Name: Sarah Bocanegra  Encounter Date: 03/28/2023     Have you been cared for by a [facility] in the last 3 years and, if so, which description applies to you? Yes  I have been here within the last 3 years, and my medical history has NOT changed since that time  I am MALE/not capable of bearing children  REVIEW OF SYSTEMS:  Have you recently had or currently have any of the following? · No changes in my recent health  PAST MEDICAL HISTORY:  Have you personally ever had or currently have any of the following? If \"YES,\" then please provide more detail  · No changes in my medical history  FAMILY HISTORY:  Any \"first degree relatives\" (parent, brother, sister, or child) with the following? • No changes in my family's known health  PATIENT EXPERIENCE:    • Do you want the Dermatologist to perform a COMPLETE skin exam today including a clinical examination under the \"bra and underwear\" areas? NO  • If necessary, do we have your permission to call and leave a detailed message on your Preferred Phone number that includes your specific medical information?   Yes      No Known Allergies   Current Outpatient Medications:   •  ascorbic acid (VITAMIN C) 500 mg tablet, Take 500 mg by mouth daily, Disp: , Rfl:   •  atorvastatin (LIPITOR) 10 mg tablet, Take 1 tablet (10 mg total) by mouth daily, Disp: 90 tablet, Rfl: 0  •  esomeprazole (NexIUM) 20 mg capsule, Take 20 mg by mouth as needed, Disp: , Rfl:   •  lutein 6 mg, Take 6 mg by mouth daily, Disp: , Rfl:   •  methylPREDNISolone 4 MG tablet therapy pack, Use as directed on package, Disp: 1 each, Rfl: 0  •  Multiple Vitamin (MULTIVITAMIN) capsule, Take 1 capsule by mouth daily, Disp: , Rfl:   •  Restasis 0 05 % ophthalmic emulsion, Administer 1 drop to both eyes 2 (two) times a day , Disp: , Rfl:   •  sildenafil (VIAGRA) 100 mg tablet, Take 1 tablet (100 mg total) by mouth daily as needed for " "erectile dysfunction, Disp: 6 tablet, Rfl: 0          • Whom besides the patient is providing clinical information about today's encounter?   o NO ADDITIONAL HISTORIAN (patient alone provided history)    Physical Exam and Assessment/Plan by Diagnosis:    1  NEOPLASM OF UNCERTAIN BEHAVIOR OF SKIN     Physical Exam:  • (Anatomic Location); (Size and Morphological Description); (Differential Diagnosis):  ? Specimen A: Left lower cheek skin (has blue dot next to it in photo); 0 5 cm pink papule; DDX:  Benign nevus vs basal cell                Additional History of Present Condition:  Noticed during today's skin exam; patient unsure if changing     Assessment and Plan:  • I have discussed with the patient that a sample of skin via a \"skin biopsy” would be potentially helpful to further make a specific diagnosis under the microscope  • Based on a thorough discussion of this condition and the management approach to it (including a comprehensive discussion of the known risks, side effects and potential benefits of treatment), the patient (family) agrees to implement the following specific plan:    PROCEDURE TANGENTIAL (SHAVE) BIOPSY NOTE:    • Performing Physician: Dr Elizalde  • Anatomic Location; Clinical Description with size (cm); Pre-Op Diagnosis:  Specimen A: Left lower cheek skin; 0 5 cm pink papule; DDX:  Benign nevus vs basal cell   • Post-op diagnosis: Same     • Local anesthesia: 1% xylocaine with epi      • Topical anesthesia: None    • Hemostasis: Aluminum chloride     PROCEDURE TANGENTIAL (SHAVE) BIOPSY NOTE:    • Performing Physician: Dr Elizalde  • Anatomic Location; Clinical Description with size (cm); Pre-Op Diagnosis:  Specimen B:Right hip;1 07cm irritated warty growth; Ddx: irritated seborrheic keratosis rule out squamous cell carcinoma     • Post-op diagnosis: Same     • Local anesthesia: 1% xylocaine with epi      • Topical anesthesia: None    • Hemostasis: Aluminum chloride     After obtaining informed " "consent  at which time there was a discussion about the purpose of biopsy  and low risks of infection and bleeding  The area was prepped and draped in the usual fashion  Anesthesia was obtained with 1% lidocaine with epinephrine  A shave biopsy to an appropriate sampling depth was obtained by Shave (Dermablade or 15 blade) The resulting wound was covered with surgical ointment and bandaged appropriately  The patient tolerated the procedure well without complications and was without signs of functional compromise  Specimen has been sent for review by Dermatopathology  Standard post-procedure care has been explained and has been included in written form within the patient's copy of Informed Consent  INFORMED CONSENT DISCUSSION AND POST-OPERATIVE INSTRUCTIONS FOR PATIENT    I   RATIONALE FOR PROCEDURE  I understand that a skin biopsy allows the Dermatologist to test a lesion or rash under the microscope to obtain a diagnosis  It usually involves numbing the area with numbing medication and removing a small piece of skin; sometimes the area will be closed with sutures  In this specific procedure, sutures are not usually needed  If any sutures are placed, then they are usually need to be removed in 2 weeks or less  I understand that my Dermatologist recommends that a skin \"shave\" biopsy be performed today  A local anesthetic, similar to the kind that a dentist uses when filling a cavity, will be injected with a very small needle into the skin area to be sampled  The injected skin and tissue underneath \"will go to sleep” and become numb so no pain should be felt afterwards  An instrument shaped like a tiny \"razor blade\" (shave biopsy instrument) will be used to cut a small piece of tissue and skin from the area so that a sample of tissue can be taken and examined more closely under the microscope    A slight amount of bleeding will occur, but it will be stopped with direct pressure and a pressure " "bandage and any other appropriate methods  I understands that a scar will form where the wound was created  Surgical ointment will be applied to help protect the wound  Sutures are not usually needed  II   RISKS AND POTENTIAL COMPLICATIONS   I understand the risks and potential complications of a skin biopsy include but are not limited to the following:  • Bleeding  • Infection  • Pain  • Scar/keloid  • Skin discoloration  • Incomplete Removal  • Recurrence  • Nerve Damage/Numbness/Loss of Function  • Allergic Reaction to Anesthesia  • Biopsies are diagnostic procedures and based on findings additional treatment or evaluation may be required  • Loss or destruction of specimen resulting in no additional findings    My Dermatologist has explained to me the nature of the condition, the nature of the procedure, and the benefits to be reasonably expected compared with alternative approaches  My Dermatologist has discussed the likelihood of major risks or complications of this procedure including the specific risks listed above, such as bleeding, infection, and scarring/keloid  I understand that a scar is expected after this procedure  I understand that my physician cannot predict if the scar will form a \"keloid,\" which extends beyond the borders of the wound that is created  A keloid is a thick, painful, and bumpy scar  A keloid can be difficult to treat, as it does not always respond well to therapy, which includes injecting cortisone directly into the keloid every few weeks  While this usually reduces the pain and size of the scar, it does not eliminate it  I understand that photographs may be taken before and after the procedure  These will be maintained as part of the medical providers confidential records and may not be made available to me    I further authorize the medical provider to use the photographs for teaching purposes or to illustrate scientific papers, books, or lectures if in his/her " "judgment, medical research, education, or science may benefit from its use  I have had an opportunity to fully inquire about the risks and benefits of this procedure and its alternatives  I have been given ample time and opportunity to ask questions and to seek a second opinion if I wished to do so  I acknowledge that there have specifically been no guarantees as to the cosmetic results from the procedure  I am aware that with any procedure there is always the possibility of an unexpected complication  III  POST-PROCEDURAL CARE (WHAT YOU WILL NEED TO DO \"AFTER THE BIOPSY\" TO OPTIMIZE HEALING)    • Keep the area clean and dry  Try NOT to remove the bandage or get it wet for the first 24 hours  • Gently clean the area and apply surgical ointment (such as Vaseline petrolatum ointment, which is available \"over the counter\" and not a prescription) to the biopsy site for up to 2 weeks straight  This acts to protect the wound from the outside world  • Sutures are not usually placed in this procedure  If any sutures were placed, return for suture removal as instructed (generally 1 week for the face, 2 weeks for the body)  • Take Acetaminophen (Tylenol) for discomfort, if no contraindications  Ibuprofen or aspirin could make bleeding worse  • Call our office immediately for signs of infection: fever, chills, increased redness, warmth, tenderness, discomfort/pain, or pus or foul smell coming from the wound  WHAT TO DO IF THERE IS ANY BLEEDING? If a small amount of bleeding is noticed, place a clean cloth over the area and apply firm pressure for ten minutes  Check the wound after 10 minutes of direct pressure  If bleeding persists, try one more time for an additional 10 minutes of direct pressure on the area    If the bleeding becomes heavier or does not stop after the second attempt, or if you have any other questions about this procedure, then please call your [facility] by " calling [phone] (SKIN)  I hereby acknowledge that I have reviewed and verified the site with my Dermatologist and have requested and authorized my Dermatologist to proceed with the procedure      Scribe Attestation    I,:  Teetee Hernandez MA am acting as a scribe while in the presence of the attending physician :       I,:  Rubén Payne MD personally performed the services described in this documentation    as scribed in my presence :

## 2023-03-28 NOTE — PATIENT INSTRUCTIONS
"III  POST-PROCEDURAL CARE (WHAT YOU WILL NEED TO DO \"AFTER THE BIOPSY\" TO OPTIMIZE HEALING)    Keep the area clean and dry  Try NOT to remove the bandage or get it wet for the first 24 hours  Gently clean the area and apply surgical ointment (such as Vaseline petrolatum ointment, which is available \"over the counter\" and not a prescription) to the biopsy site for up to 2 weeks straight  This acts to protect the wound from the outside world  Sutures are not usually placed in this procedure  If any sutures were placed, return for suture removal as instructed (generally 1 week for the face, 2 weeks for the body)  Take Acetaminophen (Tylenol) for discomfort, if no contraindications  Ibuprofen or aspirin could make bleeding worse  Call our office immediately for signs of infection: fever, chills, increased redness, warmth, tenderness, discomfort/pain, or pus or foul smell coming from the wound  WHAT TO DO IF THERE IS ANY BLEEDING? If a small amount of bleeding is noticed, place a clean cloth over the area and apply firm pressure for ten minutes  Check the wound after 10 minutes of direct pressure  If bleeding persists, try one more time for an additional 10 minutes of direct pressure on the area    If the bleeding becomes heavier or does not stop after the second attempt, or if you have any other questions about this procedure, then please call your [facility] by calling [phone] (SKIN)  "

## 2023-04-03 NOTE — RESULT ENCOUNTER NOTE
DERMATOPATHOLOGY RESULT NOTE    Results reviewed by ordering physician  Called patient to personally discuss results  [facility]      Instructions for Clinical Derm Team:   (remember to route Result Note to appropriate staff):    None    Result & Plan by Specimen:    Specimen A: benign  Plan: reassured, benign      Specimen B: benign  Plan: reassured, benign    Final Diagnosis  A  Skin,  left lower cheek, shave biopsy:     Compound melanocytic nevus; transected             B   Skin,  right hip, shave biopsy:     SEBORRHEIC KERATOSIS, IRRITATED AND INFLAMED

## 2023-05-15 ENCOUNTER — OFFICE VISIT (OUTPATIENT)
Dept: URGENT CARE | Facility: CLINIC | Age: 63
End: 2023-05-15

## 2023-05-15 VITALS
RESPIRATION RATE: 16 BRPM | HEIGHT: 68 IN | OXYGEN SATURATION: 97 % | TEMPERATURE: 97.7 F | DIASTOLIC BLOOD PRESSURE: 86 MMHG | WEIGHT: 167 LBS | HEART RATE: 71 BPM | BODY MASS INDEX: 25.31 KG/M2 | SYSTOLIC BLOOD PRESSURE: 120 MMHG

## 2023-05-15 DIAGNOSIS — J01.40 ACUTE NON-RECURRENT PANSINUSITIS: Primary | ICD-10-CM

## 2023-05-15 RX ORDER — AMOXICILLIN AND CLAVULANATE POTASSIUM 875; 125 MG/1; MG/1
1 TABLET, FILM COATED ORAL EVERY 12 HOURS SCHEDULED
Qty: 14 TABLET | Refills: 0 | Status: SHIPPED | OUTPATIENT
Start: 2023-05-15 | End: 2023-05-22

## 2023-05-15 NOTE — PROGRESS NOTES
[facility]        NAME: Deshaun Other is a 61 y o  male  : 1960    MRN: [id number]  DATE: May 19, 2023  TIME: 11:57 AM    Assessment and Plan   Acute non-recurrent pansinusitis [J01 40]  1  Acute non-recurrent pansinusitis  amoxicillin-clavulanate (AUGMENTIN) 875-125 mg per tablet            Patient Instructions     You have been prescribed Augmentin - take as directed  Continue prednisone taper as discussed  Follow-up with your PCP as needed  Go to the ED for any severely worsening symptoms  Chief Complaint     Chief Complaint   Patient presents with   • Sinusitis     Started last Tuesday with head and chest congestion, b/l ear pressure  Post nasal drip  Coughing up grey/green phlegm  Denies dizziness  History of Present Illness       This is a 61yo male who presents for evaluation of cold symptoms x1 week  Reports sinus congestion, b/l ear pressure, PND, and cough that is loose and productive  He denies known fevers/chills, HA, sore throat, CP/SOB, and N/V/D  States he started feeling better on Friday however symptoms returned and worsened on Saturday  He has been taking various OTC allergy medicines and cold medicines with pseudoephedrine  Also tried keflex and prednisone which were previously prescribed to him  No known sick contacts  Review of Systems   Review of Systems   Constitutional: Negative for chills and fever  HENT: Positive for congestion, ear pain, postnasal drip and sinus pressure  Negative for sore throat  Eyes: Negative for discharge and redness  Respiratory: Positive for cough  Negative for shortness of breath  Cardiovascular: Negative for chest pain  Gastrointestinal: Negative for abdominal pain, diarrhea, nausea and vomiting  Neurological: Negative for dizziness, light-headedness and headaches         Current Medications       Current Outpatient Medications:   •  amoxicillin-clavulanate (AUGMENTIN) 875-125 mg per tablet, Take 1 "tablet by mouth every 12 (twelve) hours for 7 days, Disp: 14 tablet, Rfl: 0  •  ascorbic acid (VITAMIN C) 500 mg tablet, Take 500 mg by mouth daily, Disp: , Rfl:   •  atorvastatin (LIPITOR) 10 mg tablet, Take 1 tablet (10 mg total) by mouth daily, Disp: 90 tablet, Rfl: 0  •  esomeprazole (NexIUM) 20 mg capsule, Take 20 mg by mouth as needed, Disp: , Rfl:   •  lutein 6 mg, Take 6 mg by mouth daily, Disp: , Rfl:   •  Multiple Vitamin (MULTIVITAMIN) capsule, Take 1 capsule by mouth daily, Disp: , Rfl:   •  methylPREDNISolone 4 MG tablet therapy pack, Use as directed on package, Disp: 1 each, Rfl: 0  •  Restasis 0 05 % ophthalmic emulsion, Administer 1 drop to both eyes 2 (two) times a day , Disp: , Rfl:   •  sildenafil (VIAGRA) 100 mg tablet, Take 1 tablet (100 mg total) by mouth daily as needed for erectile dysfunction, Disp: 6 tablet, Rfl: 0    Current Allergies     Allergies as of 05/15/2023   • (No Known Allergies)            The following portions of the patient's history were reviewed and updated as appropriate: allergies, current medications, past family history, past medical history, past social history, past surgical history and problem list      Past Medical History:   Diagnosis Date   • Melanoma ([address] )     Left elbow       Past Surgical History:   Procedure Laterality Date   • EYE MUSCLE SURGERY Right     as child   • AK COLONOSCOPY FLX DX W/COLLJ SPEC WHEN PFRMD N/A 11/21/2018    Procedure: COLONOSCOPY;  Surgeon: Oziel Mesa MD;  Location: [facility] OR;  Service: Gastroenterology   • SKIN BIOPSY     • WISDOM TOOTH EXTRACTION         Family History   Problem Relation Age of Onset   • Parkinsonism Mother          Medications have been verified  Objective   /86   Pulse 71   Temp 97 7 °F (36 5 °C)   Resp 16   Ht 5' 8\" (1 727 m)   Wt 75 8 kg (167 lb)   SpO2 97%   BMI 25 39 kg/m²        Physical Exam     Physical Exam  Vitals and nursing note reviewed     Constitutional:       General: He is " not in acute distress  Appearance: Normal appearance  He is not ill-appearing, toxic-appearing or diaphoretic  HENT:      Head: Normocephalic  Comments: Tenderness to frontal and maxillary sinus     Right Ear: Tympanic membrane, ear canal and external ear normal       Left Ear: Tympanic membrane, ear canal and external ear normal       Nose: Congestion present  Comments: Boggy nasal turbinates     Mouth/Throat:      Mouth: Mucous membranes are moist       Pharynx: Oropharynx is clear  No oropharyngeal exudate or posterior oropharyngeal erythema  Eyes:      Conjunctiva/sclera: Conjunctivae normal    Cardiovascular:      Rate and Rhythm: Normal rate and regular rhythm  Pulses: Normal pulses  Heart sounds: Normal heart sounds  Pulmonary:      Effort: Pulmonary effort is normal       Breath sounds: Normal breath sounds  Comments: Strong, loose cough present  Musculoskeletal:         General: Normal range of motion  Cervical back: Normal range of motion and neck supple  Lymphadenopathy:      Cervical: No cervical adenopathy  Skin:     General: Skin is warm and dry  Capillary Refill: Capillary refill takes less than 2 seconds  Neurological:      Mental Status: He is alert and oriented to person, place, and time

## 2023-05-15 NOTE — PATIENT INSTRUCTIONS
You have been prescribed Augmentin - take as directed  Continue prednisone taper as discussed  Follow-up with your PCP as needed  Go to the ED for any severely worsening symptoms

## 2023-05-19 ENCOUNTER — APPOINTMENT (OUTPATIENT)
Dept: LAB | Facility: HOSPITAL | Age: 63
End: 2023-05-19

## 2023-05-19 DIAGNOSIS — Z00.8 ENCOUNTER FOR OTHER GENERAL EXAMINATION: ICD-10-CM

## 2023-05-19 LAB
CHOLEST SERPL-MCNC: 135 MG/DL
EST. AVERAGE GLUCOSE BLD GHB EST-MCNC: 120 MG/DL
HBA1C MFR BLD: 5.8 %
HDLC SERPL-MCNC: 43 MG/DL
LDLC SERPL CALC-MCNC: 51 MG/DL (ref 0–100)
NONHDLC SERPL-MCNC: 92 MG/DL
TRIGL SERPL-MCNC: 207 MG/DL

## 2023-05-21 ENCOUNTER — OFFICE VISIT (OUTPATIENT)
Dept: URGENT CARE | Facility: CLINIC | Age: 63
End: 2023-05-21

## 2023-05-21 VITALS
RESPIRATION RATE: 18 BRPM | DIASTOLIC BLOOD PRESSURE: 85 MMHG | SYSTOLIC BLOOD PRESSURE: 133 MMHG | HEART RATE: 59 BPM | TEMPERATURE: 97.9 F | OXYGEN SATURATION: 99 %

## 2023-05-21 DIAGNOSIS — H65.193 ACUTE EFFUSION OF BOTH MIDDLE EARS: Primary | ICD-10-CM

## 2023-05-21 NOTE — PATIENT INSTRUCTIONS
You have been given a referral to ENT for your persistent inner ear pain with no signs of infection  Start daily antihistamine - decongestant as discussed  Follow-up with your PCP as needed  Go to the ED for any severely worsening symptoms

## 2023-05-21 NOTE — PROGRESS NOTES
[facility]        NAME: Lin De La Rosa is a 61 y o  male  : 1960    MRN: [id number]  DATE: May 23, 2023  TIME: 2:59 PM    Assessment and Plan   Acute effusion of both middle ears [H65 [id number]]  1  Acute effusion of both middle ears  Ambulatory Referral to Otolaryngology            Patient Instructions     You have been given a referral to ENT for your persistent inner ear pain with no signs of infection  Start daily antihistamine - decongestant as discussed  Follow-up with your PCP as needed  Go to the ED for any severely worsening symptoms  Chief Complaint     Chief Complaint   Patient presents with   • Blocked Ears B/L     Pt reports that his ears still feel blocked and there is no improvement  Pt was recently seen at clinic this past Monday 5/15/23  Pt reports muffled hearing and discomfort (denies pain)  History of Present Illness       This is a 59yo male who presents for evaluation of b/l ear pain and fullness which has been persistent for the past two weeks  Patient was seen and examined by myself in the clinic on 5/15 and states he finished the course of Augmentin that was prescribed to him at this time  Cold / sinus symptoms have improved  He was also taking various OTC allergy medicines and cold medicines with pseudoephedrine which he states were not helpful at relieving his ear discomfort  He reports associated muffled hearing  Denies any ear drainage  Review of Systems   Review of Systems   Constitutional: Negative for chills and fever  HENT: Positive for ear pain and hearing loss (muffled)  Negative for congestion, ear discharge, facial swelling, sinus pressure and tinnitus  Eyes: Negative for discharge and redness  Respiratory: Negative for cough and shortness of breath  Cardiovascular: Negative for chest pain  Gastrointestinal: Negative for diarrhea, nausea and vomiting  Skin: Negative for rash and wound     Neurological: Negative for dizziness, light-headedness and headaches  Current Medications       Current Outpatient Medications:   •  ascorbic acid (VITAMIN C) 500 mg tablet, Take 500 mg by mouth daily, Disp: , Rfl:   •  esomeprazole (NexIUM) 20 mg capsule, Take 20 mg by mouth as needed, Disp: , Rfl:   •  lutein 6 mg, Take 6 mg by mouth daily, Disp: , Rfl:   •  Multiple Vitamin (MULTIVITAMIN) capsule, Take 1 capsule by mouth daily, Disp: , Rfl:   •  sildenafil (VIAGRA) 100 mg tablet, Take 1 tablet (100 mg total) by mouth daily as needed for erectile dysfunction, Disp: 6 tablet, Rfl: 0  •  atorvastatin (LIPITOR) 10 mg tablet, Take 1 tablet (10 mg total) by mouth daily, Disp: 90 tablet, Rfl: 0  •  methylPREDNISolone 4 MG tablet therapy pack, Use as directed on package, Disp: 1 each, Rfl: 0  •  Restasis 0 05 % ophthalmic emulsion, Administer 1 drop to both eyes 2 (two) times a day , Disp: , Rfl:     Current Allergies     Allergies as of 05/21/2023   • (No Known Allergies)            The following portions of the patient's history were reviewed and updated as appropriate: allergies, current medications, past family history, past medical history, past social history, past surgical history and problem list      Past Medical History:   Diagnosis Date   • Melanoma ([address] )     Left elbow       Past Surgical History:   Procedure Laterality Date   • EYE MUSCLE SURGERY Right     as child   • MO COLONOSCOPY FLX DX W/COLLJ SPEC WHEN PFRMD N/A 11/21/2018    Procedure: COLONOSCOPY;  Surgeon: Joe Atkins MD;  Location: [facility] OR;  Service: Gastroenterology   • SKIN BIOPSY     • WISDOM TOOTH EXTRACTION         Family History   Problem Relation Age of Onset   • Parkinsonism Mother          Medications have been verified          Objective   /85 (BP Location: Right arm, Patient Position: Sitting, Cuff Size: Standard)   Pulse 59   Temp 97 9 °F (36 6 °C) (Oral)   Resp 18   SpO2 99%        Physical Exam     Physical Exam  Vitals and nursing note reviewed  Constitutional:       General: He is not in acute distress  Appearance: Normal appearance  He is not ill-appearing, toxic-appearing or diaphoretic  HENT:      Head: Normocephalic  Right Ear: Ear canal and external ear normal  No drainage or swelling  A middle ear effusion (clear fluid) is present  There is no impacted cerumen  Tympanic membrane is not erythematous or bulging  Left Ear: Ear canal and external ear normal  No drainage or swelling  A middle ear effusion (clear fluid) is present  There is no impacted cerumen  Tympanic membrane is not erythematous or bulging  Nose: Nose normal       Mouth/Throat:      Mouth: Mucous membranes are moist    Eyes:      Conjunctiva/sclera: Conjunctivae normal    Cardiovascular:      Rate and Rhythm: Normal rate and regular rhythm  Pulses: Normal pulses  Heart sounds: Normal heart sounds  Pulmonary:      Effort: Pulmonary effort is normal       Breath sounds: Normal breath sounds  Musculoskeletal:         General: Normal range of motion  Cervical back: Normal range of motion and neck supple  Skin:     General: Skin is warm and dry  Capillary Refill: Capillary refill takes less than 2 seconds  Neurological:      Mental Status: He is alert and oriented to person, place, and time

## 2023-05-23 ENCOUNTER — TELEPHONE (OUTPATIENT)
Dept: FAMILY MEDICINE CLINIC | Facility: CLINIC | Age: 63
End: 2023-05-23

## 2023-05-23 DIAGNOSIS — E78.2 MIXED HYPERLIPIDEMIA: ICD-10-CM

## 2023-05-23 RX ORDER — ATORVASTATIN CALCIUM 10 MG/1
10 TABLET, FILM COATED ORAL DAILY
Qty: 90 TABLET | Refills: 0 | Status: SHIPPED | OUTPATIENT
Start: 2023-05-23

## 2023-05-23 NOTE — TELEPHONE ENCOUNTER
Hannah Linda stated that he went to Urgent Care 2x and was diagnosed with clogged ears  Given an antibiotics which did not help  Wanted to see Dr Maddi Fitzpatrick or Friday        Best number to reach him is [phone]    Thank You

## 2023-05-23 NOTE — TELEPHONE ENCOUNTER
Pt declined appointment times offered as he needs for it to be scheduled after 3:30 this week or after 4:30 next week  Stated he would call back if needed

## 2023-05-25 ENCOUNTER — OFFICE VISIT (OUTPATIENT)
Dept: FAMILY MEDICINE CLINIC | Facility: CLINIC | Age: 63
End: 2023-05-25

## 2023-05-25 VITALS
HEIGHT: 68 IN | OXYGEN SATURATION: 98 % | DIASTOLIC BLOOD PRESSURE: 86 MMHG | RESPIRATION RATE: 16 BRPM | HEART RATE: 71 BPM | SYSTOLIC BLOOD PRESSURE: 122 MMHG | TEMPERATURE: 97.9 F | WEIGHT: 165.2 LBS | BODY MASS INDEX: 25.04 KG/M2

## 2023-05-25 DIAGNOSIS — N52.9 ERECTILE DYSFUNCTION, UNSPECIFIED ERECTILE DYSFUNCTION TYPE: ICD-10-CM

## 2023-05-25 DIAGNOSIS — K21.00 GASTROESOPHAGEAL REFLUX DISEASE WITH ESOPHAGITIS, UNSPECIFIED WHETHER HEMORRHAGE: ICD-10-CM

## 2023-05-25 DIAGNOSIS — H69.83 EUSTACHIAN TUBE DYSFUNCTION, BILATERAL: Primary | ICD-10-CM

## 2023-05-25 RX ORDER — SILDENAFIL 100 MG/1
100 TABLET, FILM COATED ORAL DAILY PRN
Qty: 6 TABLET | Refills: 0 | Status: SHIPPED | OUTPATIENT
Start: 2023-05-25

## 2023-05-25 RX ORDER — PANTOPRAZOLE SODIUM 40 MG/1
40 TABLET, DELAYED RELEASE ORAL DAILY
Qty: 30 TABLET | Refills: 1 | Status: SHIPPED | OUTPATIENT
Start: 2023-05-25

## 2023-05-25 RX ORDER — PREDNISONE 10 MG/1
TABLET ORAL
Qty: 21 TABLET | Refills: 0 | Status: SHIPPED | OUTPATIENT
Start: 2023-05-25

## 2023-05-25 NOTE — PROGRESS NOTES
Assessment/Plan:     Diagnoses and all orders for this visit:    Eustachian tube dysfunction, bilateral  -     predniSONE 10 mg tablet; 6 tabs on Day 1,5 tabs on Day 2,4 tabs on Day 3,3 tabs on Day 4,2 tabs on  Day 5And 1 tab on Day 6    Gastroesophageal reflux disease with esophagitis, unspecified whether hemorrhage  -     pantoprazole (PROTONIX) 40 mg tablet; Take 1 tablet (40 mg total) by mouth daily    Erectile dysfunction, unspecified erectile dysfunction type  -     sildenafil (VIAGRA) 100 mg tablet; Take 1 tablet (100 mg total) by mouth daily as needed for erectile dysfunction       I do suspect his issue is likely related to both uncontrolled reflux as well as some allergy issues  We will reorder his prednisone  We will also start pantoprazole 40 mg 1 month  Follow-up as needed      Subjective:     Chief Complaint   Patient presents with   • Earache     Went to urgent care twice and nothing is helping and is now getting worse again and cough is back   • caring starts with you        Patient ID: Yennifer Maxwell is a 61 y o  male  Patient presents today for continuing ear issues  Still feels his ears being clogged  He seen urgent care twice and was placed on prednisone and Augmentin  He now has also developing a cough and soreness in his throat      The following portions of the patient's history were reviewed and updated as appropriate: allergies, current medications, past family history, past medical history, past social history, past surgical history and problem list     Review of Systems   Constitutional: Negative  HENT: Positive for congestion, ear pain and sore throat  Eyes: Negative  Respiratory: Negative  Cardiovascular: Negative  Gastrointestinal: Negative  Endocrine: Negative  Genitourinary: Negative  Musculoskeletal: Negative  Skin: Negative  Allergic/Immunologic: Negative  Neurological: Negative  Hematological: Negative      Psychiatric/Behavioral: "Negative  All other systems reviewed and are negative  Objective:    Vitals:    05/25/23 1539   BP: 122/86   BP Location: Left arm   Patient Position: Sitting   Cuff Size: Large   Pulse: 71   Resp: 16   Temp: 97 9 °F (36 6 °C)   SpO2: 98%   Weight: 74 9 kg (165 lb 3 2 oz)   Height: 5' 8\" (1 727 m)          Physical Exam  Vitals and nursing note reviewed  Constitutional:       General: He is not in acute distress  Appearance: He is well-developed  HENT:      Head: Normocephalic  Right Ear: External ear normal       Left Ear: External ear normal       Nose: Nose normal    Eyes:      General:         Right eye: No discharge  Left eye: No discharge  Conjunctiva/sclera: Conjunctivae normal       Pupils: Pupils are equal, round, and reactive to light  Cardiovascular:      Rate and Rhythm: Normal rate and regular rhythm  Heart sounds: Normal heart sounds  Pulmonary:      Effort: Pulmonary effort is normal       Breath sounds: Normal breath sounds  Abdominal:      General: Bowel sounds are normal  There is no distension  Palpations: Abdomen is soft  Tenderness: There is no abdominal tenderness  Musculoskeletal:         General: Normal range of motion  Cervical back: Normal range of motion  Skin:     General: Skin is warm and dry  Findings: No rash  Neurological:      Mental Status: He is alert and oriented to person, place, and time  Cranial Nerves: No cranial nerve deficit  Psychiatric:         Behavior: Behavior normal          Thought Content:  Thought content normal          Judgment: Judgment normal          "

## 2023-07-17 ENCOUNTER — HOSPITAL ENCOUNTER (EMERGENCY)
Facility: HOSPITAL | Age: 63
Discharge: HOME | End: 2023-07-18
Attending: EMERGENCY MEDICINE
Payer: COMMERCIAL

## 2023-07-17 VITALS
HEART RATE: 72 BPM | DIASTOLIC BLOOD PRESSURE: 60 MMHG | RESPIRATION RATE: 22 BRPM | SYSTOLIC BLOOD PRESSURE: 114 MMHG | OXYGEN SATURATION: 98 %

## 2023-07-17 DIAGNOSIS — T78.40XA ACUTE ALLERGIC REACTION: Primary | ICD-10-CM

## 2023-07-17 DIAGNOSIS — T63.441A BEE STING: ICD-10-CM

## 2023-07-17 LAB
ALBUMIN SERPL BCP-MCNC: 4 G/DL (ref 3.5–5)
ALP SERPL-CCNC: 60 U/L (ref 34–104)
ALT SERPL W P-5'-P-CCNC: 22 U/L (ref 7–52)
ANION GAP SERPL CALCULATED.3IONS-SCNC: 8 MMOL/L
AST SERPL W P-5'-P-CCNC: 19 U/L (ref 13–39)
BASOPHILS # BLD AUTO: 0.02 THOUSANDS/ÂΜL (ref 0–0.1)
BASOPHILS NFR BLD AUTO: 0 % (ref 0–1)
BILIRUB SERPL-MCNC: 0.4 MG/DL (ref 0.2–1)
BUN SERPL-MCNC: 14 MG/DL (ref 5–25)
CALCIUM SERPL-MCNC: 8.4 MG/DL (ref 8.4–10.2)
CARDIAC TROPONIN I PNL SERPL HS: 6 NG/L
CHLORIDE SERPL-SCNC: 105 MMOL/L (ref 96–108)
CO2 SERPL-SCNC: 26 MMOL/L (ref 21–32)
CREAT SERPL-MCNC: 1.14 MG/DL (ref 0.6–1.3)
EOSINOPHIL # BLD AUTO: 0.02 THOUSAND/ÂΜL (ref 0–0.61)
EOSINOPHIL NFR BLD AUTO: 0 % (ref 0–6)
ERYTHROCYTE [DISTWIDTH] IN BLOOD BY AUTOMATED COUNT: 12.2 % (ref 11.6–15.1)
GFR SERPL CREATININE-BSD FRML MDRD: 68 ML/MIN/1.73SQ M
GLUCOSE SERPL-MCNC: 154 MG/DL (ref 65–140)
HCT VFR BLD AUTO: 41.8 % (ref 36.5–49.3)
HGB BLD-MCNC: 13.6 G/DL (ref 12–17)
IMM GRANULOCYTES # BLD AUTO: 0.08 THOUSAND/UL (ref 0–0.2)
IMM GRANULOCYTES NFR BLD AUTO: 1 % (ref 0–2)
LYMPHOCYTES # BLD AUTO: 1.8 THOUSANDS/ÂΜL (ref 0.6–4.47)
LYMPHOCYTES NFR BLD AUTO: 13 % (ref 14–44)
MCH RBC QN AUTO: 30.3 PG (ref 26.8–34.3)
MCHC RBC AUTO-ENTMCNC: 32.5 G/DL (ref 31.4–37.4)
MCV RBC AUTO: 93 FL (ref 82–98)
MONOCYTES # BLD AUTO: 0.76 THOUSAND/ÂΜL (ref 0.17–1.22)
MONOCYTES NFR BLD AUTO: 5 % (ref 4–12)
NEUTROPHILS # BLD AUTO: 11.4 THOUSANDS/ÂΜL (ref 1.85–7.62)
NEUTS SEG NFR BLD AUTO: 81 % (ref 43–75)
NRBC BLD AUTO-RTO: 0 /100 WBCS
PLATELET # BLD AUTO: 207 THOUSANDS/UL (ref 149–390)
PMV BLD AUTO: 10.7 FL (ref 8.9–12.7)
POTASSIUM SERPL-SCNC: 3.3 MMOL/L (ref 3.5–5.3)
PROT SERPL-MCNC: 6.1 G/DL (ref 6.4–8.4)
RBC # BLD AUTO: 4.49 MILLION/UL (ref 3.88–5.62)
SODIUM SERPL-SCNC: 139 MMOL/L (ref 135–147)
WBC # BLD AUTO: 14.08 THOUSAND/UL (ref 4.31–10.16)

## 2023-07-17 PROCEDURE — 85025 COMPLETE CBC W/AUTO DIFF WBC: CPT | Performed by: EMERGENCY MEDICINE

## 2023-07-17 PROCEDURE — 80053 COMPREHEN METABOLIC PANEL: CPT | Performed by: EMERGENCY MEDICINE

## 2023-07-17 PROCEDURE — 93005 ELECTROCARDIOGRAM TRACING: CPT

## 2023-07-17 PROCEDURE — 36415 COLL VENOUS BLD VENIPUNCTURE: CPT | Performed by: EMERGENCY MEDICINE

## 2023-07-17 PROCEDURE — 84484 ASSAY OF TROPONIN QUANT: CPT | Performed by: EMERGENCY MEDICINE

## 2023-07-17 RX ORDER — METHYLPREDNISOLONE 4 MG/1
TABLET ORAL
Qty: 21 TABLET | Refills: 0 | Status: SHIPPED | OUTPATIENT
Start: 2023-07-18

## 2023-07-17 RX ORDER — EPINEPHRINE 1 MG/ML
1 INJECTION, SOLUTION, CONCENTRATE INTRAVENOUS ONCE
Status: COMPLETED | OUTPATIENT
Start: 2023-07-17 | End: 2023-07-17

## 2023-07-17 RX ORDER — METHYLPREDNISOLONE SODIUM SUCCINATE 125 MG/2ML
125 INJECTION, POWDER, LYOPHILIZED, FOR SOLUTION INTRAMUSCULAR; INTRAVENOUS ONCE
Status: COMPLETED | OUTPATIENT
Start: 2023-07-17 | End: 2023-07-17

## 2023-07-17 RX ORDER — EPINEPHRINE 1 MG/ML
0.3 INJECTION, SOLUTION, CONCENTRATE INTRAVENOUS ONCE
Status: COMPLETED | OUTPATIENT
Start: 2023-07-17 | End: 2023-07-17

## 2023-07-17 RX ORDER — DIPHENHYDRAMINE HYDROCHLORIDE 50 MG/ML
50 INJECTION INTRAMUSCULAR; INTRAVENOUS ONCE
Status: COMPLETED | OUTPATIENT
Start: 2023-07-17 | End: 2023-07-17

## 2023-07-17 RX ORDER — FAMOTIDINE 10 MG/ML
20 INJECTION, SOLUTION INTRAVENOUS ONCE
Status: COMPLETED | OUTPATIENT
Start: 2023-07-17 | End: 2023-07-17

## 2023-07-17 RX ORDER — FAMOTIDINE 20 MG/1
20 TABLET, FILM COATED ORAL 2 TIMES DAILY
Qty: 14 TABLET | Refills: 0 | Status: SHIPPED | OUTPATIENT
Start: 2023-07-18 | End: 2023-07-25

## 2023-07-17 RX ORDER — EPINEPHRINE 0.3 MG/.3ML
0.3 INJECTION SUBCUTANEOUS ONCE
Qty: 0.6 ML | Refills: 0 | Status: SHIPPED | OUTPATIENT
Start: 2023-07-17 | End: 2023-07-17

## 2023-07-17 RX ADMIN — EPINEPHRINE 0.3 MG: 1 INJECTION, SOLUTION, CONCENTRATE INTRAVENOUS at 22:11

## 2023-07-17 RX ADMIN — FAMOTIDINE 20 MG: 10 INJECTION INTRAVENOUS at 21:01

## 2023-07-17 RX ADMIN — METHYLPREDNISOLONE SODIUM SUCCINATE 125 MG: 125 INJECTION, POWDER, FOR SOLUTION INTRAMUSCULAR; INTRAVENOUS at 20:51

## 2023-07-17 RX ADMIN — EPINEPHRINE 0.3 MG: 1 INJECTION, SOLUTION, CONCENTRATE INTRAVENOUS at 21:08

## 2023-07-17 RX ADMIN — DIPHENHYDRAMINE HYDROCHLORIDE 50 MG: 50 INJECTION, SOLUTION INTRAMUSCULAR; INTRAVENOUS at 20:44

## 2023-07-17 RX ADMIN — SODIUM CHLORIDE 2000 ML: 0.9 INJECTION, SOLUTION INTRAVENOUS at 21:19

## 2023-07-18 LAB
ATRIAL RATE: 71 BPM
P AXIS: 44 DEGREES
PR INTERVAL: 166 MS
QRS AXIS: 18 DEGREES
QRSD INTERVAL: 92 MS
QT INTERVAL: 434 MS
QTC INTERVAL: 471 MS
T WAVE AXIS: 39 DEGREES
VENTRICULAR RATE: 71 BPM

## 2023-07-18 PROCEDURE — 93010 ELECTROCARDIOGRAM REPORT: CPT | Performed by: INTERNAL MEDICINE

## 2023-07-18 NOTE — ED PROVIDER NOTES
History  Chief Complaint   Patient presents with   • Allergic Reaction     Pt was stung by a bee on the back of his left hand. Pt became dizzy, sweaty, sob, and hypotensive per ems. Pt received 0.3 of epi, and 50mg of benedryl per ems     This is a 30-year-old male who states that he got bit by a bee left hand just prior to arrival followed by allergic reaction type symptoms including dizziness sweatiness diffuse rash shortness of breath and chest tightness. Last tetanus is up-to-date. Has a history of hypercholesterolemia. No similar allergic reactions. Patient was given 50 mg of Benadryl and 0.3 mg of epinephrine by paramedics with symptomatic improvement. He is currently awake alert with blood pressure 101/55 still has diffuse rash      History provided by:  Patient, EMS personnel and spouse  Medical Problem  Location:  Left hand  Quality:  Bee sting with allergic reaction  Severity:  Severe  Onset quality:  Sudden  Timing:  Constant  Progression:  Improving  Chronicity:  New  Context:  Left hand bee sting allergic reaction  Relieved by:  Benadryl and epinephrine  Associated symptoms: chest pain, nausea, rash and shortness of breath        Prior to Admission Medications   Prescriptions Last Dose Informant Patient Reported? Taking?    Multiple Vitamin (MULTIVITAMIN) capsule  Self Yes No   Sig: Take 1 capsule by mouth daily   ascorbic acid (VITAMIN C) 500 mg tablet  Self Yes No   Sig: Take 500 mg by mouth daily   atorvastatin (LIPITOR) 10 mg tablet  Self No No   Sig: Take 1 tablet (10 mg total) by mouth daily   esomeprazole (NexIUM) 20 mg capsule  Self Yes No   Sig: Take 20 mg by mouth as needed   lutein 6 mg  Self Yes No   Sig: Take 6 mg by mouth daily   pantoprazole (PROTONIX) 40 mg tablet   No No   Sig: Take 1 tablet (40 mg total) by mouth daily   predniSONE 10 mg tablet   No No   Si tabs on Day 1,5 tabs on Day 2,4 tabs on Day 3,3 tabs on Day 4,2 tabs on  Day 5And 1 tab on Day 6   sildenafil (VIAGRA) 100 mg tablet   No No   Sig: Take 1 tablet (100 mg total) by mouth daily as needed for erectile dysfunction      Facility-Administered Medications: None       Past Medical History:   Diagnosis Date   • GERD (gastroesophageal reflux disease)    • Melanoma (HCC)     Left elbow   • Otitis media 2weeks ago    Recent troubles/ still having pressure   • Visual impairment Since childhood    Congenital blind spot right eye       Past Surgical History:   Procedure Laterality Date   • EYE MUSCLE SURGERY Right     as child   • CT COLONOSCOPY FLX DX W/COLLJ SPEC WHEN PFRMD N/A 11/21/2018    Procedure: COLONOSCOPY;  Surgeon: Benigno Charles MD;  Location: [facility] OR;  Service: Gastroenterology   • SKIN BIOPSY     • WISDOM TOOTH EXTRACTION         Family History   Problem Relation Age of Onset   • Parkinsonism Mother      I have reviewed and agree with the history as documented. E-Cigarette/Vaping   • E-Cigarette Use Never User      E-Cigarette/Vaping Substances   • Nicotine No    • THC No    • CBD No    • Flavoring No    • Other No    • Unknown No      Social History     Tobacco Use   • Smoking status: Never     Passive exposure: Never   • Smokeless tobacco: Never   Vaping Use   • Vaping Use: Never used   Substance Use Topics   • Alcohol use: Yes     Comment: Once a month   • Drug use: No       Review of Systems   Constitutional: Positive for diaphoresis. Respiratory: Positive for shortness of breath. Cardiovascular: Positive for chest pain. Gastrointestinal: Positive for nausea. Skin: Positive for rash. Neurological: Positive for dizziness. All other systems reviewed and are negative. Physical Exam  Physical Exam  Vitals and nursing note reviewed. Constitutional:       Appearance: He is ill-appearing and diaphoretic. He is not toxic-appearing. HENT:      Head: Normocephalic and atraumatic.       Right Ear: Tympanic membrane, ear canal and external ear normal.      Left Ear: Tympanic membrane, ear canal and external ear normal.   Eyes:      General: No scleral icterus. Right eye: No discharge. Left eye: No discharge. Extraocular Movements: Extraocular movements intact. Pupils: Pupils are equal, round, and reactive to light. Cardiovascular:      Rate and Rhythm: Normal rate and regular rhythm. Pulses: Normal pulses. Heart sounds: No murmur heard. No friction rub. No gallop. Pulmonary:      Effort: Pulmonary effort is normal. No respiratory distress. Breath sounds: No stridor. No wheezing, rhonchi or rales. Abdominal:      General: There is no distension. Palpations: Abdomen is soft. Tenderness: There is no abdominal tenderness. There is no guarding or rebound. Musculoskeletal:         General: No swelling, tenderness, deformity or signs of injury. Normal range of motion. Cervical back: Normal range of motion and neck supple. No rigidity. Right lower leg: No edema. Left lower leg: No edema. Skin:     General: Skin is warm. Coloration: Skin is not jaundiced. Findings: Rash present. No bruising. Comments: Diffuse erythema to the trunk and proximal legs   Neurological:      General: No focal deficit present. Mental Status: He is alert and oriented to person, place, and time. Cranial Nerves: No cranial nerve deficit. Sensory: No sensory deficit. Motor: No weakness. Coordination: Coordination normal.   Psychiatric:         Mood and Affect: Mood normal.         Behavior: Behavior normal.         Thought Content:  Thought content normal.         Vital Signs  ED Triage Vitals   Temp Pulse Respirations Blood Pressure SpO2   -- 07/17/23 1958 07/17/23 1958 07/17/23 1958 07/17/23 1958    65 18 101/55 96 %      Temp src Heart Rate Source Patient Position - Orthostatic VS BP Location FiO2 (%)   -- 07/17/23 2130 07/17/23 2130 07/17/23 2130 --    Monitor Lying Right arm       Pain Score       --                  Vitals: 07/17/23 1958 07/17/23 2130   BP: 101/55 114/60   Pulse: 65 72   Patient Position - Orthostatic VS:  Lying         Visual Acuity      ED Medications  Medications   EPINEPHrine PF (FOR EMS ONLY) (ADRENALIN) 1 mg/mL injection 1 mg (0 mg Does not apply Given to EMS 7/17/23 2000)   diphenhydrAMINE (FOR EMS ONLY) (BENADRYL) injection 50 mg (0 mg Does not apply Given to EMS 7/17/23 2000)   sodium chloride 0.9 % bolus 2,000 mL (2,000 mL Intravenous New Bag 7/17/23 2119)   EPINEPHrine PF (ADRENALIN) 1 mg/mL injection 0.3 mg (0.3 mg Intramuscular Given 7/17/23 2108)   diphenhydrAMINE (BENADRYL) injection 50 mg (50 mg Intravenous Given 7/17/23 2044)   methylPREDNISolone sodium succinate (Solu-MEDROL) injection 125 mg (125 mg Intravenous Given 7/17/23 2051)   Famotidine (PF) (PEPCID) injection 20 mg (20 mg Intravenous Given 7/17/23 2101)   EPINEPHrine PF (ADRENALIN) 1 mg/mL injection 0.3 mg (0.3 mg Intramuscular Given 7/17/23 2211)       Diagnostic Studies  Results Reviewed     Procedure Component Value Units Date/Time    HS Troponin I 4hr [[id number]]     Lab Status: No result Specimen: Blood     HS Troponin 0hr (reflex protocol) [[id number]]  (Normal) Collected: 07/17/23 2117    Lab Status: Final result Specimen: Blood from Arm, Left Updated: 07/17/23 2153     hs TnI 0hr 6 ng/L     HS Troponin I 2hr [[id number]]     Lab Status: No result Specimen: Blood     Comprehensive metabolic panel [[id number]]  (Abnormal) Collected: 07/17/23 2117    Lab Status: Final result Specimen: Blood from Arm, Left Updated: 07/17/23 2147     Sodium 139 mmol/L      Potassium 3.3 mmol/L      Chloride 105 mmol/L      CO2 26 mmol/L      ANION GAP 8 mmol/L      BUN 14 mg/dL      Creatinine 1.14 mg/dL      Glucose 154 mg/dL      Calcium 8.4 mg/dL      AST 19 U/L      ALT 22 U/L      Alkaline Phosphatase 60 U/L      Total Protein 6.1 g/dL      Albumin 4.0 g/dL      Total Bilirubin 0.40 mg/dL      eGFR 68 ml/min/1.73sq m     Narrative:      Consolidated [employer] guidelines for Chronic Kidney Disease (CKD):   •  Stage 1 with normal or high GFR (GFR > 90 mL/min/1.73 square meters)  •  Stage 2 Mild CKD (GFR = 60-89 mL/min/1.73 square meters)  •  Stage 3A Moderate CKD (GFR = 45-59 mL/min/1.73 square meters)  •  Stage 3B Moderate CKD (GFR = 30-44 mL/min/1.73 square meters)  •  Stage 4 Severe CKD (GFR = 15-29 mL/min/1.73 square meters)  •  Stage 5 End Stage CKD (GFR <15 mL/min/1.73 square meters)  Note: GFR calculation is accurate only with a steady state creatinine    CBC and differential [[id number]]  (Abnormal) Collected: 07/17/23 2117    Lab Status: Final result Specimen: Blood from Arm, Left Updated: 07/17/23 2128     WBC 14.08 Thousand/uL      RBC 4.49 Million/uL      Hemoglobin 13.6 g/dL      Hematocrit 41.8 %      MCV 93 fL      MCH 30.3 pg      MCHC 32.5 g/dL      RDW 12.2 %      MPV 10.7 fL      Platelets 834 Thousands/uL      nRBC 0 /100 WBCs      Neutrophils Relative 81 %      Immat GRANS % 1 %      Lymphocytes Relative 13 %      Monocytes Relative 5 %      Eosinophils Relative 0 %      Basophils Relative 0 %      Neutrophils Absolute 11.40 Thousands/µL      Immature Grans Absolute 0.08 Thousand/uL      Lymphocytes Absolute 1.80 Thousands/µL      Monocytes Absolute 0.76 Thousand/µL      Eosinophils Absolute 0.02 Thousand/µL      Basophils Absolute 0.02 Thousands/µL                  No orders to display              Procedures  ECG 12 Lead Documentation Only    Date/Time: 7/17/2023 8:44 PM    Performed by: Mary Perez DO  Authorized by: Mary Perez DO    ECG reviewed by me, the ED Provider: yes    Patient location:  ED  Rate:     ECG rate:  71  Rhythm:     Rhythm: sinus rhythm    Conduction:     Conduction: normal    T waves:     T waves: normal      CriticalCare Time    Date/Time: 7/17/2023 9:34 PM    Performed by: Mary Perez DO  Authorized by: Mary Perez DO    Critical care provider statement:     Critical care time (minutes):  30    Critical care time was exclusive of:  Separately billable procedures and treating other patients    Critical care was necessary to treat or prevent imminent or life-threatening deterioration of the following conditions: Acute allergic reaction. Critical care was time spent personally by me on the following activities:  Evaluation of patient's response to treatment, examination of patient, interpretation of cardiac output measurements, ordering and performing treatments and interventions, ordering and review of laboratory studies and re-evaluation of patient's condition    I assumed direction of critical care for this patient from another provider in my specialty: no               ED Course  ED Course as of 07/17/23 2352 Mon Jul 17, 2023 2133 Feeling improved rash has diminished but still jittery we will give a third dose of epinephrine   2257 Pressure continues to improve we will continue to monitor. Patient feeling much improved   2351 Patient feeling much better rash resolved okay for discharge             HEART Risk Score    Flowsheet Row Most Recent Value   Heart Score Risk Calculator    History 0 Filed at: 07/17/2023 2258   ECG 0 Filed at: 07/17/2023 2258   Age 1 Filed at: 07/17/2023 2258   Risk Factors 1 Filed at: 07/17/2023 2258   Troponin 0 Filed at: 07/17/2023 2258   HEART Score 2 Filed at: 07/17/2023 2258                        SBIRT 22yo+    Flowsheet Row Most Recent Value   Initial Alcohol Screen: US AUDIT-C     1. How often do you have a drink containing alcohol? 0 Filed at: 07/17/2023 2008   2. How many drinks containing alcohol do you have on a typical day you are drinking? 0 Filed at: 07/17/2023 2008   3a. Male UNDER 65: How often do you have five or more drinks on one occasion? 0 Filed at: 07/17/2023 2008   3b. FEMALE Any Age, or MALE 65+: How often do you have 4 or more drinks on one occassion?  0 Filed at: 07/17/2023 2008   Audit-C Score 0 Filed at: 07/17/2023 2008 LARISSA: How many times in the past year have you. .. Used an illegal drug or used a prescription medication for non-medical reasons? Never Filed at: 07/17/2023 2008                    Medical Decision Making  Bee sting with acute allergic reaction work-up and treatment in progress we will check labs give IV fluids additional Benadryl steroids and epinephrine    Amount and/or Complexity of Data Reviewed  Labs: ordered. Risk  Prescription drug management. Disposition  Final diagnoses:   Acute allergic reaction   Bee sting - Left hand     Time reflects when diagnosis was documented in both MDM as applicable and the Disposition within this note     Time User Action Codes Description Comment    7/17/2023 11:00 PM Gabriele Aas Add [T78.40XA] Acute allergic reaction     7/17/2023 11:00 PM Gabriele Aas Add [M62.441N] Bee sting     7/17/2023 11:00 PM Gabriele Aas Modify [W16.573L] Bee sting Left hand      ED Disposition     ED Disposition   Discharge    Condition   Stable    Date/Time   Mon Jul 17, 2023 11:52 PM    Comment   Eduardo Arreola discharge to home/self care.                Follow-up Information     Follow up With Specialties Details Why Contact Info Additional Information    Jenn Soliman, DO Family Medicine   [address]  [address]  [facility] [phone]        [address] [facility] Emergency Department Emergency Medicine  As needed, If symptoms worsen [address] [facility] [id number] [phone] [address] [facility] Emergency Department, [address] [facility], [address] [facility]          Patient's Medications   Discharge Prescriptions    DIPHENHYDRAMINE (BENADRYL) 50 MG TABLET    Take 1 tablet (50 mg total) by mouth every 6 (six) hours as needed for itching       Start Date: 7/17/2023 End Date: --       Order Dose: 50 mg       Quantity: 30 tablet    Refills: 0    EPINEPHRINE (EPIPEN) 0.3 MG/0.3 ML SOAJ    Inject 0.3 mL (0.3 mg total) into a muscle once for 1 dose As needed for severe allergic reaction       Start Date: 7/17/2023 End Date: 7/17/2023       Order Dose: 0.3 mg       Quantity: 0.6 mL    Refills: 0    FAMOTIDINE (PEPCID) 20 MG TABLET    Take 1 tablet (20 mg total) by mouth 2 (two) times a day for 7 days Do not start before July 18, 2023. Start Date: 7/18/2023 End Date: 7/25/2023       Order Dose: 20 mg       Quantity: 14 tablet    Refills: 0    METHYLPREDNISOLONE 4 MG TABLET THERAPY PACK    Use as directed on package Do not start before July 18, 2023. Start Date: 7/18/2023 End Date: --       Order Dose: --       Quantity: 21 tablet    Refills: 0       No discharge procedures on file.     PDMP Review     None          ED Provider  Electronically Signed by           José Bingham DO  07/17/23 0403

## 2023-08-20 DIAGNOSIS — N52.9 ERECTILE DYSFUNCTION, UNSPECIFIED ERECTILE DYSFUNCTION TYPE: ICD-10-CM

## 2023-08-20 DIAGNOSIS — E78.2 MIXED HYPERLIPIDEMIA: ICD-10-CM

## 2023-08-21 RX ORDER — ATORVASTATIN CALCIUM 10 MG/1
10 TABLET, FILM COATED ORAL DAILY
Qty: 90 TABLET | Refills: 1 | Status: SHIPPED | OUTPATIENT
Start: 2023-08-21

## 2023-08-21 RX ORDER — SILDENAFIL 100 MG/1
100 TABLET, FILM COATED ORAL DAILY PRN
Qty: 6 TABLET | Refills: 0 | Status: SHIPPED | OUTPATIENT
Start: 2023-08-21

## 2023-10-30 DIAGNOSIS — N52.9 ERECTILE DYSFUNCTION, UNSPECIFIED ERECTILE DYSFUNCTION TYPE: ICD-10-CM

## 2023-10-30 RX ORDER — SILDENAFIL 100 MG/1
100 TABLET, FILM COATED ORAL DAILY PRN
Qty: 6 TABLET | Refills: 0 | Status: SHIPPED | OUTPATIENT
Start: 2023-10-30

## 2024-02-18 DIAGNOSIS — N52.9 ERECTILE DYSFUNCTION, UNSPECIFIED ERECTILE DYSFUNCTION TYPE: ICD-10-CM

## 2024-02-18 DIAGNOSIS — E78.2 MIXED HYPERLIPIDEMIA: ICD-10-CM

## 2024-02-19 RX ORDER — ATORVASTATIN CALCIUM 10 MG/1
10 TABLET, FILM COATED ORAL DAILY
Qty: 90 TABLET | Refills: 0 | Status: SHIPPED | OUTPATIENT
Start: 2024-02-19

## 2024-02-19 RX ORDER — SILDENAFIL 100 MG/1
100 TABLET, FILM COATED ORAL DAILY PRN
Qty: 6 TABLET | Refills: 0 | Status: SHIPPED | OUTPATIENT
Start: 2024-02-19

## 2024-04-01 ENCOUNTER — OFFICE VISIT (OUTPATIENT)
Dept: FAMILY MEDICINE CLINIC | Facility: CLINIC | Age: 64
End: 2024-04-01
Payer: COMMERCIAL

## 2024-04-01 VITALS
HEART RATE: 58 BPM | TEMPERATURE: 97.3 F | BODY MASS INDEX: 25.67 KG/M2 | HEIGHT: 68 IN | SYSTOLIC BLOOD PRESSURE: 120 MMHG | WEIGHT: 169.4 LBS | OXYGEN SATURATION: 98 % | DIASTOLIC BLOOD PRESSURE: 76 MMHG | RESPIRATION RATE: 16 BRPM

## 2024-04-01 DIAGNOSIS — Z12.5 SCREENING FOR PROSTATE CANCER: ICD-10-CM

## 2024-04-01 DIAGNOSIS — Z00.00 ENCOUNTER FOR WELL ADULT EXAM WITHOUT ABNORMAL FINDINGS: Primary | ICD-10-CM

## 2024-04-01 DIAGNOSIS — E78.5 HYPERLIPIDEMIA, UNSPECIFIED HYPERLIPIDEMIA TYPE: ICD-10-CM

## 2024-04-01 DIAGNOSIS — Z13.1 SCREENING FOR DIABETES MELLITUS: ICD-10-CM

## 2024-04-01 DIAGNOSIS — Z13.6 SCREENING FOR CARDIOVASCULAR CONDITION: ICD-10-CM

## 2024-04-01 PROBLEM — L30.9 ECZEMA: Status: RESOLVED | Noted: 2022-06-06 | Resolved: 2024-04-01

## 2024-04-01 PROBLEM — M25.511 ACUTE PAIN OF RIGHT SHOULDER: Status: RESOLVED | Noted: 2022-06-06 | Resolved: 2024-04-01

## 2024-04-01 PROCEDURE — 99396 PREV VISIT EST AGE 40-64: CPT | Performed by: FAMILY MEDICINE

## 2024-04-01 NOTE — PROGRESS NOTES
Assessment/Plan:       Diagnoses and all orders for this visit:    Encounter for well adult exam without abnormal findings    Hyperlipidemia, unspecified hyperlipidemia type  -     Comprehensive metabolic panel; Future  -     Lipid panel; Future    Screening for prostate cancer  -     PSA, Total Screen; Future    Screening for diabetes mellitus  -     Comprehensive metabolic panel; Future  -     Hemoglobin A1C; Future    Screening for cardiovascular condition  -     CT coronary calcium score; Future        We discussed that tinnitus and unfortunately is not much can be done at this point but he needs to protect his hearing so recommend wearing ear protection at work or around loud noises  The lesion on his back looks like a cyst that is now fading away there is no treatment needed at this time  Labs ordered  CT calcium score ordered  He will continue his current medications he can follow-up in 6 to 12 months or sooner if needed    Subjective:     Chief Complaint   Patient presents with    Mass     Pt reports lump on back that peaked about a month ago. About the size of a quarter, had turned black and blue and had a head on it and it did open and drain. It has reduced in size.    Tinnitus     Pt reports ringing in his ears for quite a while. 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         Patient ID: Anthony Ochoa is a 63 y.o. male.    Patient presents today for annual visit  He is complaining of tinnitus that  He had a lump on his back that opened up and started to drain and now is almost resolved  No other acute complaints today        The following portions of the patient's history were reviewed and updated as appropriate: allergies, current medications, past family history, past medical history, past social history, past surgical history and problem list.    Review of Systems   Constitutional: Negative.    HENT:  Positive for tinnitus.    Eyes: Negative.    Respiratory: Negative.    "  Cardiovascular: Negative.    Gastrointestinal: Negative.    Endocrine: Negative.    Genitourinary: Negative.    Musculoskeletal: Negative.    Skin: Negative.    Allergic/Immunologic: Negative.    Neurological: Negative.    Hematological: Negative.    Psychiatric/Behavioral: Negative.     All other systems reviewed and are negative.        Objective:    Vitals:    04/01/24 1807   BP: 120/76   BP Location: Left arm   Patient Position: Sitting   Cuff Size: Standard   Pulse: 58   Resp: 16   Temp: (!) 97.3 °F (36.3 °C)   TempSrc: Tympanic   SpO2: 98%   Weight: 76.8 kg (169 lb 6.4 oz)   Height: 5' 8\" (1.727 m)          Physical Exam  Vitals and nursing note reviewed.   Constitutional:       Appearance: Normal appearance.   HENT:      Head: Normocephalic.      Right Ear: Tympanic membrane, ear canal and external ear normal.      Left Ear: Tympanic membrane, ear canal and external ear normal.      Nose: Nose normal.      Mouth/Throat:      Mouth: Mucous membranes are moist.   Eyes:      Conjunctiva/sclera: Conjunctivae normal.      Pupils: Pupils are equal, round, and reactive to light.   Cardiovascular:      Rate and Rhythm: Normal rate and regular rhythm.   Pulmonary:      Effort: Pulmonary effort is normal.      Breath sounds: Normal breath sounds.   Abdominal:      General: Abdomen is flat. Bowel sounds are normal.      Palpations: Abdomen is soft.   Musculoskeletal:         General: Normal range of motion.   Skin:     General: Skin is warm and dry.      Comments: Healing cyst that is flat on back   Neurological:      General: No focal deficit present.      Mental Status: He is alert and oriented to person, place, and time. Mental status is at baseline.   Psychiatric:         Mood and Affect: Mood normal.         Behavior: Behavior normal.         Thought Content: Thought content normal.         Judgment: Judgment normal.         "

## 2024-05-17 DIAGNOSIS — E78.2 MIXED HYPERLIPIDEMIA: ICD-10-CM

## 2024-05-17 DIAGNOSIS — N52.9 ERECTILE DYSFUNCTION, UNSPECIFIED ERECTILE DYSFUNCTION TYPE: ICD-10-CM

## 2024-05-18 RX ORDER — SILDENAFIL 100 MG/1
100 TABLET, FILM COATED ORAL DAILY PRN
Qty: 6 TABLET | Refills: 1 | Status: SHIPPED | OUTPATIENT
Start: 2024-05-18

## 2024-05-18 RX ORDER — ATORVASTATIN CALCIUM 10 MG/1
10 TABLET, FILM COATED ORAL DAILY
Qty: 90 TABLET | Refills: 1 | Status: SHIPPED | OUTPATIENT
Start: 2024-05-18

## 2024-05-24 NOTE — TELEPHONE ENCOUNTER
Patients wife is calling stating that the refill request made days ago has not gone through that the pharmacy informed them they never received it, please review and advise  Thank you

## 2024-05-31 ENCOUNTER — TELEPHONE (OUTPATIENT)
Age: 64
End: 2024-05-31

## 2024-05-31 DIAGNOSIS — E78.2 MIXED HYPERLIPIDEMIA: ICD-10-CM

## 2024-05-31 DIAGNOSIS — N52.9 ERECTILE DYSFUNCTION, UNSPECIFIED ERECTILE DYSFUNCTION TYPE: ICD-10-CM

## 2024-05-31 RX ORDER — ATORVASTATIN CALCIUM 10 MG/1
10 TABLET, FILM COATED ORAL DAILY
Qty: 90 TABLET | Refills: 1 | Status: SHIPPED | OUTPATIENT
Start: 2024-05-31

## 2024-05-31 RX ORDER — SILDENAFIL 100 MG/1
100 TABLET, FILM COATED ORAL DAILY PRN
Qty: 6 TABLET | Refills: 1 | Status: SHIPPED | OUTPATIENT
Start: 2024-05-31

## 2024-05-31 NOTE — TELEPHONE ENCOUNTER
Pt called in regard to two (2) Rx  atorvastatin (LIPITOR) 10 mg tablet  and    sildenafil (VIAGRA) 100 mg tablet  that his pharmacy [facility] - [place], [place] - [address]  [phone]  Fax: [phone] states that they do not have. After chart review, both were sent on 5/18/24. Pt would appreciate a call back [phone] thank you.

## 2024-07-08 ENCOUNTER — APPOINTMENT (OUTPATIENT)
Dept: LAB | Facility: HOSPITAL | Age: 64
End: 2024-07-08
Payer: COMMERCIAL

## 2024-07-08 DIAGNOSIS — Z13.1 SCREENING FOR DIABETES MELLITUS: ICD-10-CM

## 2024-07-08 DIAGNOSIS — E78.5 HYPERLIPIDEMIA, UNSPECIFIED HYPERLIPIDEMIA TYPE: ICD-10-CM

## 2024-07-08 DIAGNOSIS — Z12.5 SCREENING FOR PROSTATE CANCER: ICD-10-CM

## 2024-07-08 LAB
ALBUMIN SERPL BCG-MCNC: 4.2 G/DL (ref 3.5–5)
ALP SERPL-CCNC: 60 U/L (ref 34–104)
ALT SERPL W P-5'-P-CCNC: 27 U/L (ref 7–52)
ANION GAP SERPL CALCULATED.3IONS-SCNC: 7 MMOL/L (ref 4–13)
AST SERPL W P-5'-P-CCNC: 21 U/L (ref 13–39)
BILIRUB SERPL-MCNC: 0.55 MG/DL (ref 0.2–1)
BUN SERPL-MCNC: 15 MG/DL (ref 5–25)
CALCIUM SERPL-MCNC: 9.2 MG/DL (ref 8.4–10.2)
CHLORIDE SERPL-SCNC: 106 MMOL/L (ref 96–108)
CHOLEST SERPL-MCNC: 207 MG/DL
CO2 SERPL-SCNC: 29 MMOL/L (ref 21–32)
CREAT SERPL-MCNC: 1.25 MG/DL (ref 0.6–1.3)
EST. AVERAGE GLUCOSE BLD GHB EST-MCNC: 120 MG/DL
GFR SERPL CREATININE-BSD FRML MDRD: 60 ML/MIN/1.73SQ M
GLUCOSE SERPL-MCNC: 133 MG/DL (ref 65–140)
HBA1C MFR BLD: 5.8 %
HDLC SERPL-MCNC: 47 MG/DL
LDLC SERPL CALC-MCNC: 108 MG/DL (ref 0–100)
NONHDLC SERPL-MCNC: 160 MG/DL
POTASSIUM SERPL-SCNC: 3.9 MMOL/L (ref 3.5–5.3)
PROT SERPL-MCNC: 6.8 G/DL (ref 6.4–8.4)
PSA SERPL-MCNC: 0.87 NG/ML (ref 0–4)
SODIUM SERPL-SCNC: 142 MMOL/L (ref 135–147)
TRIGL SERPL-MCNC: 262 MG/DL

## 2024-07-08 PROCEDURE — 36415 COLL VENOUS BLD VENIPUNCTURE: CPT

## 2024-07-08 PROCEDURE — 80061 LIPID PANEL: CPT

## 2024-07-08 PROCEDURE — 80053 COMPREHEN METABOLIC PANEL: CPT

## 2024-07-08 PROCEDURE — G0103 PSA SCREENING: HCPCS

## 2024-07-08 PROCEDURE — 83036 HEMOGLOBIN GLYCOSYLATED A1C: CPT

## 2024-07-31 ENCOUNTER — TELEPHONE (OUTPATIENT)
Age: 64
End: 2024-07-31

## 2024-07-31 DIAGNOSIS — M77.10 LATERAL EPICONDYLITIS, UNSPECIFIED LATERALITY: Primary | ICD-10-CM

## 2024-07-31 RX ORDER — METHYLPREDNISOLONE 4 MG/1
TABLET ORAL
Qty: 1 EACH | Refills: 0 | Status: SHIPPED | OUTPATIENT
Start: 2024-07-31

## 2024-07-31 NOTE — TELEPHONE ENCOUNTER
Pt states he hurt his elbow playing sports and has now a painful left elbow. Pt plays tennis and would like to know  if Dr Weeks can prescribe him a Medrol Dose PK, first avail appt Sept 20, 2024

## 2024-11-04 ENCOUNTER — TELEPHONE (OUTPATIENT)
Age: 64
End: 2024-11-04

## 2024-11-04 DIAGNOSIS — M77.10 LATERAL EPICONDYLITIS, UNSPECIFIED LATERALITY: Primary | ICD-10-CM

## 2024-11-04 NOTE — TELEPHONE ENCOUNTER
Pt called stating he is having elbow pain again. He was wondering if Dr. Weeks did steroid injections. If not could he recommend someone who does.    Please advise patient at [phone]

## 2024-11-24 DIAGNOSIS — E78.2 MIXED HYPERLIPIDEMIA: ICD-10-CM

## 2024-11-24 DIAGNOSIS — N52.9 ERECTILE DYSFUNCTION, UNSPECIFIED ERECTILE DYSFUNCTION TYPE: ICD-10-CM

## 2024-11-26 RX ORDER — ATORVASTATIN CALCIUM 10 MG/1
10 TABLET, FILM COATED ORAL DAILY
Qty: 90 TABLET | Refills: 1 | Status: SHIPPED | OUTPATIENT
Start: 2024-11-26

## 2024-11-26 RX ORDER — SILDENAFIL 100 MG/1
100 TABLET, FILM COATED ORAL DAILY PRN
Qty: 6 TABLET | Refills: 1 | Status: SHIPPED | OUTPATIENT
Start: 2024-11-26

## 2024-12-11 ENCOUNTER — OFFICE VISIT (OUTPATIENT)
Dept: OBGYN CLINIC | Facility: CLINIC | Age: 64
End: 2024-12-11
Payer: COMMERCIAL

## 2024-12-11 VITALS
SYSTOLIC BLOOD PRESSURE: 142 MMHG | BODY MASS INDEX: 26.07 KG/M2 | WEIGHT: 172 LBS | HEIGHT: 68 IN | DIASTOLIC BLOOD PRESSURE: 68 MMHG

## 2024-12-11 DIAGNOSIS — M77.02 MEDIAL EPICONDYLITIS OF ELBOW, LEFT: ICD-10-CM

## 2024-12-11 PROCEDURE — 99203 OFFICE O/P NEW LOW 30 MIN: CPT | Performed by: ORTHOPAEDIC SURGERY

## 2024-12-11 PROCEDURE — 20551 NJX 1 TENDON ORIGIN/INSJ: CPT | Performed by: ORTHOPAEDIC SURGERY

## 2024-12-11 RX ORDER — BETAMETHASONE SODIUM PHOSPHATE AND BETAMETHASONE ACETATE 3; 3 MG/ML; MG/ML
6 INJECTION, SUSPENSION INTRA-ARTICULAR; INTRALESIONAL; INTRAMUSCULAR; SOFT TISSUE
Status: COMPLETED | OUTPATIENT
Start: 2024-12-11 | End: 2024-12-11

## 2024-12-11 RX ORDER — LIDOCAINE HYDROCHLORIDE 10 MG/ML
1 INJECTION, SOLUTION INFILTRATION; PERINEURAL
Status: COMPLETED | OUTPATIENT
Start: 2024-12-11 | End: 2024-12-11

## 2024-12-11 RX ADMIN — LIDOCAINE HYDROCHLORIDE 1 ML: 10 INJECTION, SOLUTION INFILTRATION; PERINEURAL at 15:30

## 2024-12-11 RX ADMIN — BETAMETHASONE SODIUM PHOSPHATE AND BETAMETHASONE ACETATE 6 MG: 3; 3 INJECTION, SUSPENSION INTRA-ARTICULAR; INTRALESIONAL; INTRAMUSCULAR; SOFT TISSUE at 15:30

## 2024-12-11 NOTE — PROGRESS NOTES
Assessment/Plan:  1. Medial epicondylitis of elbow, left  Ambulatory Referral to Orthopedic Surgery    Ambulatory Referral to PT/OT Hand Therapy    Hand/upper extremity injection: L elbow          Subjective history, physical examination performed, diagnostic imaging reviewed at today's visit  Diagnosis was discussed, left medial epicondylitis and left ulnar neuritis vs cubital tunnel syndrome. There was TTP of medial epicondyle and +Tinel's at the elbow  Treatment options were discussed which included nonoperative treatment - therapy, activity modification, NSAIDs (oral v topical), bracing, and at patient's request CSI.    Risks, benefits, and realistic expectations for treatment options were discussed.    The patient was given an opportunity to ask questions.  Questions were answered to the patient's satisfaction.    Through shared decision making, the patient decided to move forward with stretching exercises, exercises were demonstrated in the office today as well as formal therapy for medial epicondylitis. OT script was provided. Left medial epicondyle CSI was performed int he office without complication. Post injection protocol/expectations were reviewed. If the CSI is not significantly beneficial for him, we discussed some of his symptoms may be coming from his ulnar nerve. Activity modification was also discussed.   Follow up in 8 weeks time, may cancel if he is doing well.         Hand/upper extremity injection: L elbow  Universal Protocol:  procedure performed by consultantConsent: Verbal consent obtained. Written consent not obtained.  Risks and benefits: risks, benefits and alternatives were discussed  Consent given by: patient  Patient understanding: patient states understanding of the procedure being performed  Site marked: the operative site was marked  Patient identity confirmed: verbally with patient  Supporting Documentation  Indications: pain   Procedure Details  Condition:medial epicondylitis  Site: L elbow   Needle size: 25 G  Ultrasound guidance: no  Medications administered: 1 mL lidocaine 1 %; 6 mg betamethasone acetate-betamethasone sodium phosphate 6 (3-3) mg/mL  Patient tolerance: patient tolerated the procedure well with no immediate complications  Dressing:  Sterile dressing applied         cc: left elbow pain     Subjective:   Anthony Ochoa is a left hand dominant 64 y.o. male who presents to the office for left elbow pain. He notes pain to the medial aspect of his left elbow. His pain has been ongoing since May. He was playing a lot of disc golf at that time as well as tennis. He did also have a fall when playing disc golf at that time and played tennis the following Monday. He has since been performing activity modification and has no longer been playing disc golf. He did try a tennis elbow strap, which he notes did not improve his symptoms. He does perform stretching exercises at home. He did take an oral steroid, which was beneficial for approx. 2 weeks. He is not currently taking anything for pain control. He denies associated numbness and tingling.       Review of Systems   Constitutional:  Negative for chills, fever and unexpected weight change.   HENT:  Negative for hearing loss, nosebleeds and sore throat.    Eyes:  Negative for pain, redness and visual disturbance.   Respiratory:  Negative for cough, shortness of breath and wheezing.    Cardiovascular:  Negative for chest pain, palpitations and leg swelling.   Gastrointestinal:  Negative for abdominal pain, nausea and vomiting.   Endocrine: Negative for polydipsia and polyuria.   Genitourinary:  Negative for difficulty urinating and hematuria.   Musculoskeletal:  Negative for arthralgias, joint swelling and myalgias.   Skin:  Negative for rash and wound.   Neurological:  Negative for dizziness, numbness and headaches.   Psychiatric/Behavioral:  Negative for decreased concentration, dysphoric mood and suicidal ideas. The patient is  not nervous/anxious.          Past Medical History:   Diagnosis Date    GERD (gastroesophageal reflux disease)     Melanoma (HCC)     Left elbow    Otitis media 2weeks ago    Recent troubles/ still having pressure    Skin disorder 2019    Melanoma    Stomach disorder     GERD    Visual impairment Since childhood    Congenital blind spot right eye       Past Surgical History:   Procedure Laterality Date    EYE MUSCLE SURGERY Right     as child    RI COLONOSCOPY FLX DX W/COLLJ SPEC WHEN PFRMD N/A 11/21/2018    Procedure: COLONOSCOPY;  Surgeon: Ina Roe MD;  Location: [facility] OR;  Service: Gastroenterology    SKIN BIOPSY      WISDOM TOOTH EXTRACTION         Family History   Problem Relation Age of Onset    Parkinsonism Mother     Neurological problems Mother         Parkinson    Diabetes Father        Social History     Occupational History    Not on file   Tobacco Use    Smoking status: Never     Passive exposure: Never    Smokeless tobacco: Never   Vaping Use    Vaping status: Never Used   Substance and Sexual Activity    Alcohol use: Yes     Comment: Once a month    Drug use: No    Sexual activity: Yes     Partners: Female     Birth control/protection: Male Sterilization     Comment: Wife post menopausal         Current Outpatient Medications:     ascorbic acid (VITAMIN C) 500 mg tablet, Take 500 mg by mouth daily, Disp: , Rfl:     atorvastatin (LIPITOR) 10 mg tablet, Take 1 tablet (10 mg total) by mouth daily, Disp: 90 tablet, Rfl: 1    EPINEPHrine (EPIPEN) 0.3 mg/0.3 mL SOAJ, Inject 0.3 mL (0.3 mg total) into a muscle once for 1 dose As needed for severe allergic reaction, Disp: 0.6 mL, Rfl: 0    esomeprazole (NexIUM) 20 mg capsule, Take 20 mg by mouth as needed, Disp: , Rfl:     methylPREDNISolone 4 MG tablet therapy pack, Use as directed on package, Disp: 1 each, Rfl: 0    Multiple Vitamin (MULTIVITAMIN) capsule, Take 1 capsule by mouth daily, Disp: , Rfl:     sildenafil (VIAGRA) 100 mg tablet, Take 1  tablet (100 mg total) by mouth daily as needed for erectile dysfunction, Disp: 6 tablet, Rfl: 1    Allergies   Allergen Reactions    Other Anaphylaxis     BEES       Objective:  Vitals:    12/11/24 1523   BP: 142/68       The patient was awake, alert, and oriented to person, place, and time.  No acute distress.  Normocephalic.  EOMI.  Mucous membranes moist.  Normal respiratory effort.    Examination of the affected extremity was compared to the unaffected extremity.  Skin was intact.  No swelling or ecchymosis.  No deformity.  Hand and fingers were warm and well-perfused.  Capillary refill was brisk.  Full active range of motion of the elbows, forearms, wrists, and fingers.  5/5 elbow flexors/extensors, wrist flexor/extensors, and . Scapular winging on the left.     Left elbow: No erythema, ecchymosis or edema, full elbow ROM, + tinel's at the elbow, medial epicondyle tender to palpation       Imaging/Diagnostic Studies:    No imaging to review       This document was created using speech voice recognition software.   Grammatical errors, random word insertions, pronoun errors, and incomplete sentences are an occasional consequence of this system due to software limitations, ambient noise, and hardware issues.   Any formal questions or concerns about content, text, or information contained within the body of this dictation should be directly addressed to the provider for clarification.      Scribe Attestation      I,:  Holly Joseph MA am acting as a scribe while in the presence of the attending physician.:       I,:  Haven Marie MD personally performed the services described in this documentation    as scribed in my presence.:

## 2025-05-07 ENCOUNTER — OFFICE VISIT (OUTPATIENT)
Dept: GASTROENTEROLOGY | Facility: CLINIC | Age: 65
End: 2025-05-07
Payer: COMMERCIAL

## 2025-05-07 VITALS
DIASTOLIC BLOOD PRESSURE: 77 MMHG | BODY MASS INDEX: 26.22 KG/M2 | WEIGHT: 177 LBS | HEIGHT: 69 IN | SYSTOLIC BLOOD PRESSURE: 156 MMHG

## 2025-05-07 DIAGNOSIS — K21.9 GASTROESOPHAGEAL REFLUX DISEASE, UNSPECIFIED WHETHER ESOPHAGITIS PRESENT: Primary | ICD-10-CM

## 2025-05-07 PROCEDURE — 99244 OFF/OP CNSLTJ NEW/EST MOD 40: CPT | Performed by: INTERNAL MEDICINE

## 2025-05-07 RX ORDER — SODIUM CHLORIDE, SODIUM LACTATE, POTASSIUM CHLORIDE, CALCIUM CHLORIDE 600; 310; 30; 20 MG/100ML; MG/100ML; MG/100ML; MG/100ML
125 INJECTION, SOLUTION INTRAVENOUS CONTINUOUS
OUTPATIENT
Start: 2025-05-07

## 2025-05-07 NOTE — PATIENT INSTRUCTIONS
Scheduled date of EGD(as of today): 5/29/25  Physician performing EGD: Dr Vang  Location of EGD: [facility]  Instructions reviewed with patient by: Ashlyn  Clearances: none

## 2025-05-07 NOTE — PROGRESS NOTES
"Name: Anthony Ochoa      : 1960      MRN: [id number]  Encounter Provider: Lb Vang DO  Encounter Date: 2025   Encounter department: [facility]  :  Assessment & Plan  Gastroesophageal reflux disease, unspecified whether esophagitis present  Due to the chronicity of his need for PPI as well as being a  man, I will proceed with upper endoscopy to rule out erosive disease as well as Yoon's esophagus.  Procedure risks and preparation discussed in detail.  Orders:    EGD; Future        History of Present Illness   HPI  Anthony Ochoa is a 65 y.o. male who presents in consultation from his PCP due to chronic GERD.  For years, he has required over-the-counter proton pump inhibitors to control heartburn.  He denies regurgitation and dysphagia.  He initially took over-the-counter omeprazole, but this did not quite control his heartburn so he switched to esomeprazole.  As long as he takes it in the morning, it does a good job controlling his heartburn.  However, if he misses a dose he feels heartburn in the afternoon.  Despite the chronicity of his PPI use, he has never had an upper endoscopy.  He is up-to-date on screening colonoscopy.      Review of Systems       Objective   /77   Ht 5' 9\" (1.753 m)   Wt 80.3 kg (177 lb)   BMI 26.14 kg/m²      Physical Exam  General Appearance: NAD, cooperative, alert  Eyes: Anicteric  GI:  Soft, non-tender, non-distended; normal bowel sounds; no masses, no organomegaly   Rectal: Deferred  Musculoskeletal: No edema.  Skin:  No jaundice      "

## 2025-05-07 NOTE — H&P (VIEW-ONLY)
"Name: Anthony Ochoa      : 1960      MRN: [id number]  Encounter Provider: Lb Vang DO  Encounter Date: 2025   Encounter department: [facility]  :  Assessment & Plan  Gastroesophageal reflux disease, unspecified whether esophagitis present  Due to the chronicity of his need for PPI as well as being a  man, I will proceed with upper endoscopy to rule out erosive disease as well as Yoon's esophagus.  Procedure risks and preparation discussed in detail.  Orders:    EGD; Future        History of Present Illness   HPI  Anthony Ochoa is a 65 y.o. male who presents in consultation from his PCP due to chronic GERD.  For years, he has required over-the-counter proton pump inhibitors to control heartburn.  He denies regurgitation and dysphagia.  He initially took over-the-counter omeprazole, but this did not quite control his heartburn so he switched to esomeprazole.  As long as he takes it in the morning, it does a good job controlling his heartburn.  However, if he misses a dose he feels heartburn in the afternoon.  Despite the chronicity of his PPI use, he has never had an upper endoscopy.  He is up-to-date on screening colonoscopy.      Review of Systems       Objective   /77   Ht 5' 9\" (1.753 m)   Wt 80.3 kg (177 lb)   BMI 26.14 kg/m²      Physical Exam  General Appearance: NAD, cooperative, alert  Eyes: Anicteric  GI:  Soft, non-tender, non-distended; normal bowel sounds; no masses, no organomegaly   Rectal: Deferred  Musculoskeletal: No edema.  Skin:  No jaundice      "

## 2025-05-15 ENCOUNTER — ANESTHESIA EVENT (OUTPATIENT)
Dept: ANESTHESIOLOGY | Facility: AMBULATORY SURGERY CENTER | Age: 65
End: 2025-05-15

## 2025-05-15 ENCOUNTER — ANESTHESIA (OUTPATIENT)
Dept: ANESTHESIOLOGY | Facility: AMBULATORY SURGERY CENTER | Age: 65
End: 2025-05-15

## 2025-05-16 ENCOUNTER — TELEPHONE (OUTPATIENT)
Dept: GASTROENTEROLOGY | Facility: CLINIC | Age: 65
End: 2025-05-16

## 2025-05-16 NOTE — TELEPHONE ENCOUNTER
Procedure confirmed: Endoscopy    Via: Voicemail    Instructions given: Given to Patient at Visit    Prep Given: N/A    Provider: Dr. Lb Vang    Date: 5-29-25    Location: [facility]  [address] [place] [id number]    Medication holds: No        Diabetic: No    Call the office if there are any questions.       [phone]

## 2025-05-27 DIAGNOSIS — E78.2 MIXED HYPERLIPIDEMIA: ICD-10-CM

## 2025-05-28 RX ORDER — ATORVASTATIN CALCIUM 10 MG/1
10 TABLET, FILM COATED ORAL DAILY
Qty: 90 TABLET | Refills: 0 | Status: SHIPPED | OUTPATIENT
Start: 2025-05-28

## 2025-05-29 ENCOUNTER — ANESTHESIA (OUTPATIENT)
Dept: GASTROENTEROLOGY | Facility: AMBULATORY SURGERY CENTER | Age: 65
End: 2025-05-29

## 2025-05-29 ENCOUNTER — ANESTHESIA EVENT (OUTPATIENT)
Dept: GASTROENTEROLOGY | Facility: AMBULATORY SURGERY CENTER | Age: 65
End: 2025-05-29

## 2025-05-29 ENCOUNTER — HOSPITAL ENCOUNTER (OUTPATIENT)
Dept: GASTROENTEROLOGY | Facility: AMBULATORY SURGERY CENTER | Age: 65
Discharge: HOME | End: 2025-05-29
Attending: INTERNAL MEDICINE
Payer: COMMERCIAL

## 2025-05-29 VITALS
HEART RATE: 61 BPM | TEMPERATURE: 97.8 F | WEIGHT: 177 LBS | DIASTOLIC BLOOD PRESSURE: 68 MMHG | BODY MASS INDEX: 26.22 KG/M2 | OXYGEN SATURATION: 97 % | RESPIRATION RATE: 18 BRPM | SYSTOLIC BLOOD PRESSURE: 137 MMHG | HEIGHT: 69 IN

## 2025-05-29 DIAGNOSIS — K21.9 GASTROESOPHAGEAL REFLUX DISEASE, UNSPECIFIED WHETHER ESOPHAGITIS PRESENT: ICD-10-CM

## 2025-05-29 PROCEDURE — 43235 EGD DIAGNOSTIC BRUSH WASH: CPT | Performed by: INTERNAL MEDICINE

## 2025-05-29 RX ORDER — PROPOFOL 10 MG/ML
INJECTION, EMULSION INTRAVENOUS AS NEEDED
Status: DISCONTINUED | OUTPATIENT
Start: 2025-05-29 | End: 2025-05-29

## 2025-05-29 RX ORDER — SODIUM CHLORIDE, SODIUM LACTATE, POTASSIUM CHLORIDE, CALCIUM CHLORIDE 600; 310; 30; 20 MG/100ML; MG/100ML; MG/100ML; MG/100ML
125 INJECTION, SOLUTION INTRAVENOUS CONTINUOUS
Status: DISCONTINUED | OUTPATIENT
Start: 2025-05-29 | End: 2025-06-02 | Stop reason: HOSPADM

## 2025-05-29 RX ADMIN — SODIUM CHLORIDE, SODIUM LACTATE, POTASSIUM CHLORIDE, CALCIUM CHLORIDE 125 ML/HR: 600; 310; 30; 20 INJECTION, SOLUTION INTRAVENOUS at 10:17

## 2025-05-29 RX ADMIN — PROPOFOL 150 MG: 10 INJECTION, EMULSION INTRAVENOUS at 10:25

## 2025-05-29 RX ADMIN — PROPOFOL 50 MG: 10 INJECTION, EMULSION INTRAVENOUS at 10:26

## 2025-05-29 RX ADMIN — SODIUM CHLORIDE, SODIUM LACTATE, POTASSIUM CHLORIDE, CALCIUM CHLORIDE: 600; 310; 30; 20 INJECTION, SOLUTION INTRAVENOUS at 10:13

## 2025-05-29 RX ADMIN — SODIUM CHLORIDE, SODIUM LACTATE, POTASSIUM CHLORIDE, CALCIUM CHLORIDE: 600; 310; 30; 20 INJECTION, SOLUTION INTRAVENOUS at 10:28

## 2025-05-29 NOTE — INTERVAL H&P NOTE
H&P reviewed. After examining the patient I find no changes in the patients condition since the H&P had been written.    Vitals:    05/29/25 1010   BP: 157/73   Pulse: 58   Resp: 20   Temp: 97.8 °F (36.6 °C)   SpO2: 98%

## 2025-05-29 NOTE — ANESTHESIA PREPROCEDURE EVALUATION
Procedure:  EGD    Relevant Problems   CARDIO   (+) Hyperlipidemia      GI/HEPATIC   (+) Gastroesophageal reflux disease        Physical Exam    Airway     Mallampati score: II  TM Distance: <3 FB  Neck ROM: full  Mouth opening: < 4 cm      Cardiovascular      Dental   Comment: None loose, No notable dental hx     Pulmonary      Neurological    He appears awake, alert and oriented x3.      Other Findings        Anesthesia Plan  ASA Score- 2     Anesthesia Type- IV sedation with anesthesia with ASA Monitors.         Additional Monitors:     Airway Plan: natural airway.    Comment: NPO after MN    Patient educated on the possibility for awareness under sedation and of the possibility of airway intervention in the event of an airway or procedural emergency  .       Plan Factors-Exercise tolerance (METS): >4 METS.    Chart reviewed.    Patient summary reviewed.    Patient is not a current smoker.              Induction- intravenous.    Postoperative Plan- .   Monitoring Plan - Monitoring plan - standard ASA monitoring      Perioperative Resuscitation Plan - Level 1 - Full Code.       Informed Consent- Anesthetic plan and risks discussed with patient.  I personally reviewed this patient with the CRNA. Discussed and agreed on the Anesthesia Plan with the CRNA..      NPO Status:  Vitals Value Taken Time   Date of last liquid 05/28/25 05/29/25 10:02   Time of last liquid 2100 05/29/25 10:02   Date of last solid 05/28/25 05/29/25 10:02   Time of last solid 2100 05/29/25 10:02

## 2025-06-23 ENCOUNTER — APPOINTMENT (OUTPATIENT)
Dept: LAB | Facility: HOSPITAL | Age: 65
End: 2025-06-23

## 2025-06-23 DIAGNOSIS — Z00.8 HEALTH EXAMINATION IN POPULATION SURVEY: ICD-10-CM

## 2025-06-23 LAB
CHOLEST SERPL-MCNC: 185 MG/DL (ref ?–200)
EST. AVERAGE GLUCOSE BLD GHB EST-MCNC: 120 MG/DL
HBA1C MFR BLD: 5.8 %
HDLC SERPL-MCNC: 47 MG/DL
LDLC SERPL CALC-MCNC: 86 MG/DL (ref 0–100)
NONHDLC SERPL-MCNC: 138 MG/DL
TRIGL SERPL-MCNC: 259 MG/DL (ref ?–150)

## 2025-06-23 PROCEDURE — 36415 COLL VENOUS BLD VENIPUNCTURE: CPT

## 2025-06-23 PROCEDURE — 83036 HEMOGLOBIN GLYCOSYLATED A1C: CPT

## 2025-06-23 PROCEDURE — 80061 LIPID PANEL: CPT

## 2025-07-01 ENCOUNTER — OFFICE VISIT (OUTPATIENT)
Dept: FAMILY MEDICINE CLINIC | Facility: CLINIC | Age: 65
End: 2025-07-01
Payer: COMMERCIAL

## 2025-07-01 VITALS
TEMPERATURE: 98.4 F | HEART RATE: 63 BPM | HEIGHT: 68 IN | RESPIRATION RATE: 16 BRPM | BODY MASS INDEX: 25.61 KG/M2 | SYSTOLIC BLOOD PRESSURE: 138 MMHG | WEIGHT: 169 LBS | DIASTOLIC BLOOD PRESSURE: 84 MMHG | OXYGEN SATURATION: 97 %

## 2025-07-01 DIAGNOSIS — D72.829 LEUKOCYTOSIS, UNSPECIFIED TYPE: ICD-10-CM

## 2025-07-01 DIAGNOSIS — Z12.5 SCREENING FOR PROSTATE CANCER: ICD-10-CM

## 2025-07-01 DIAGNOSIS — Z13.6 SCREENING FOR CARDIOVASCULAR CONDITION: ICD-10-CM

## 2025-07-01 DIAGNOSIS — Z23 ENCOUNTER FOR IMMUNIZATION: ICD-10-CM

## 2025-07-01 DIAGNOSIS — Z00.01 ENCOUNTER FOR WELL ADULT EXAM WITH ABNORMAL FINDINGS: Primary | ICD-10-CM

## 2025-07-01 PROCEDURE — 99396 PREV VISIT EST AGE 40-64: CPT | Performed by: FAMILY MEDICINE

## 2025-07-01 PROCEDURE — 90750 HZV VACC RECOMBINANT IM: CPT

## 2025-07-01 PROCEDURE — 90471 IMMUNIZATION ADMIN: CPT

## 2025-07-01 NOTE — PROGRESS NOTES
"Name: Anthony Ochoa      : 1960      MRN: [id number]  Encounter Provider: Uriel Weeks DO  Encounter Date: 2025   Encounter department: [facility]  :  Assessment & Plan  Encounter for well adult exam with abnormal findings  65-year-old male  Reviewed care concerns with you lab work  Calcium score ordered  Other blood work ordered  Shingles vaccine given  Patient is up-to-date rest of health maintenance  He can follow-up with me in 1 year or sooner if needed       Screening for cardiovascular condition    Orders:    CT coronary calcium score; Future    Comprehensive metabolic panel; Future    Encounter for immunization    Orders:    Zoster Vaccine Recombinant IM    Leukocytosis, unspecified type    Orders:    CBC and differential; Future    Screening for prostate cancer    Orders:    PSA, Total Screen; Future          Depression Screening and Follow-up Plan: Patient was screened for depression during today's encounter. They screened negative with a PHQ-2 score of 0.        History of Present Illness   Patient presents today for yearly physical  He would like her new prescription for calcium score  We reviewed his recent blood work his triglycerides are slightly elevated but the rest of his lab work looks fine  Patient is also requesting a shingles vaccine      Review of Systems   Constitutional: Negative.    HENT: Negative.     Eyes: Negative.    Respiratory: Negative.     Cardiovascular: Negative.    Gastrointestinal: Negative.    Endocrine: Negative.    Genitourinary: Negative.    Musculoskeletal: Negative.    Skin: Negative.    Allergic/Immunologic: Negative.    Neurological: Negative.    Hematological: Negative.    Psychiatric/Behavioral: Negative.     All other systems reviewed and are negative.      Objective   /92 (BP Location: Left arm, Patient Position: Sitting, Cuff Size: Large)   Pulse 63   Temp 98.4 °F (36.9 °C) (Tympanic)   Resp 16   Ht 5' 7.7\" (1.72 m)  "  Wt 76.7 kg (169 lb)   SpO2 97%   BMI 25.92 kg/m²      Physical Exam  Vitals and nursing note reviewed.   Constitutional:       Appearance: Normal appearance. He is well-developed.   HENT:      Head: Normocephalic.      Right Ear: External ear normal.      Left Ear: External ear normal.      Nose: Nose normal.     Eyes:      Conjunctiva/sclera: Conjunctivae normal.      Pupils: Pupils are equal, round, and reactive to light.       Cardiovascular:      Rate and Rhythm: Normal rate and regular rhythm.      Heart sounds: Normal heart sounds.   Pulmonary:      Effort: Pulmonary effort is normal.      Breath sounds: Normal breath sounds.   Abdominal:      General: Bowel sounds are normal.      Palpations: Abdomen is soft.     Musculoskeletal:         General: Normal range of motion.      Cervical back: Normal range of motion and neck supple.     Skin:     General: Skin is warm and dry.     Neurological:      General: No focal deficit present.      Mental Status: He is alert and oriented to person, place, and time.     Psychiatric:         Behavior: Behavior normal.         Thought Content: Thought content normal.         Judgment: Judgment normal.         Answers submitted by the patient for this visit:  Annual Physical (Submitted on 6/26/2025)  Exercise choices: strength training exercises  Sleep choices: 7-8 hours of sleep on average  Hearing choices: normal hearing bilateral ears  Vision choices: most recent eye exam > 1 year ago, wears glasses  Dental choices: brushes teeth twice daily  Do you currently have an OB/GYN provider that you routinely follow with?: No  Any history of sexual transmitted disease/infection?: No  Do you have an advance directive/living will?: Yes  Do you have a durable power of  (POA)?: Yes

## 2025-07-24 ENCOUNTER — HOSPITAL ENCOUNTER (OUTPATIENT)
Dept: CT IMAGING | Facility: HOSPITAL | Age: 65
Discharge: HOME | End: 2025-07-24
Attending: FAMILY MEDICINE
Payer: COMMERCIAL

## 2025-07-24 DIAGNOSIS — Z13.6 SCREENING FOR CARDIOVASCULAR CONDITION: ICD-10-CM

## 2025-07-24 PROCEDURE — 75571 CT HRT W/O DYE W/CA TEST: CPT

## 2025-08-20 DIAGNOSIS — E78.2 MIXED HYPERLIPIDEMIA: Primary | ICD-10-CM

## 2025-08-20 DIAGNOSIS — N52.9 ERECTILE DYSFUNCTION, UNSPECIFIED ERECTILE DYSFUNCTION TYPE: ICD-10-CM

## 2025-08-21 RX ORDER — SILDENAFIL 100 MG/1
100 TABLET, FILM COATED ORAL DAILY PRN
Qty: 6 TABLET | Refills: 1 | Status: SHIPPED | OUTPATIENT
Start: 2025-08-21

## 2025-08-21 RX ORDER — ATORVASTATIN CALCIUM 10 MG/1
10 TABLET, FILM COATED ORAL DAILY
Qty: 90 TABLET | Refills: 0 | Status: SHIPPED | OUTPATIENT
Start: 2025-08-21

## (undated) DEVICE — SYRINGE 50ML LL

## (undated) DEVICE — 1200CC GUARDIAN II: Brand: GUARDIAN

## (undated) DEVICE — SYRINGE 30ML LL

## (undated) DEVICE — TUBING SUCTION 5MM X 12 FT